# Patient Record
Sex: MALE | Race: WHITE | NOT HISPANIC OR LATINO | Employment: OTHER | URBAN - METROPOLITAN AREA
[De-identification: names, ages, dates, MRNs, and addresses within clinical notes are randomized per-mention and may not be internally consistent; named-entity substitution may affect disease eponyms.]

---

## 2017-04-10 DIAGNOSIS — I63.9 LEFT-SIDED CEREBROVASCULAR ACCIDENT (CVA): Primary | ICD-10-CM

## 2017-04-20 ENCOUNTER — HOSPITAL ENCOUNTER (OUTPATIENT)
Dept: RADIOLOGY | Facility: HOSPITAL | Age: 77
Discharge: HOME | End: 2017-04-20
Attending: ORTHOPAEDIC SURGERY

## 2017-04-20 ENCOUNTER — OFFICE VISIT (OUTPATIENT)
Dept: INTERNAL MEDICINE | Facility: CLINIC | Age: 77
End: 2017-04-20

## 2017-04-20 ENCOUNTER — OFFICE VISIT (OUTPATIENT)
Dept: ORTHOPEDICS | Facility: CLINIC | Age: 77
End: 2017-04-20

## 2017-04-20 VITALS
HEIGHT: 69 IN | DIASTOLIC BLOOD PRESSURE: 82 MMHG | BODY MASS INDEX: 29.03 KG/M2 | HEART RATE: 66 BPM | WEIGHT: 196 LBS | RESPIRATION RATE: 16 BRPM | SYSTOLIC BLOOD PRESSURE: 140 MMHG

## 2017-04-20 DIAGNOSIS — Z00.00 ROUTINE GENERAL MEDICAL EXAMINATION AT A HEALTH CARE FACILITY: Primary | ICD-10-CM

## 2017-04-20 DIAGNOSIS — I10 ESSENTIAL HYPERTENSION: ICD-10-CM

## 2017-04-20 DIAGNOSIS — M25.551 RIGHT HIP PAIN: Primary | ICD-10-CM

## 2017-04-20 DIAGNOSIS — R39.9 LOWER URINARY TRACT SYMPTOMS (LUTS): ICD-10-CM

## 2017-04-20 DIAGNOSIS — R13.13 PHARYNGEAL DYSPHAGIA: ICD-10-CM

## 2017-04-20 DIAGNOSIS — I63.9 CEREBROVASCULAR ACCIDENT (CVA), UNSPECIFIED MECHANISM: ICD-10-CM

## 2017-04-20 DIAGNOSIS — M25.551 RIGHT HIP PAIN: ICD-10-CM

## 2017-04-20 DIAGNOSIS — R13.10 SWALLOWING DYSFUNCTION: ICD-10-CM

## 2017-04-20 DIAGNOSIS — R53.83 FATIGUE, UNSPECIFIED TYPE: ICD-10-CM

## 2017-04-20 DIAGNOSIS — M70.61 GREATER TROCHANTERIC BURSITIS OF RIGHT HIP: ICD-10-CM

## 2017-04-20 PROCEDURE — 99214 OFFICE O/P EST MOD 30 MIN: CPT | Mod: S$PBB,,, | Performed by: INTERNAL MEDICINE

## 2017-04-20 PROCEDURE — 99999 PR PBB SHADOW E&M-EST. PATIENT-LVL IV: CPT | Mod: PBBFAC,,, | Performed by: ORTHOPAEDIC SURGERY

## 2017-04-20 PROCEDURE — 20610 DRAIN/INJ JOINT/BURSA W/O US: CPT | Mod: PBBFAC | Performed by: ORTHOPAEDIC SURGERY

## 2017-04-20 PROCEDURE — 72100 X-RAY EXAM L-S SPINE 2/3 VWS: CPT | Mod: TC

## 2017-04-20 PROCEDURE — 99214 OFFICE O/P EST MOD 30 MIN: CPT | Mod: PBBFAC | Performed by: ORTHOPAEDIC SURGERY

## 2017-04-20 PROCEDURE — 99213 OFFICE O/P EST LOW 20 MIN: CPT | Mod: S$PBB,25,, | Performed by: ORTHOPAEDIC SURGERY

## 2017-04-20 PROCEDURE — 73521 X-RAY EXAM HIPS BI 2 VIEWS: CPT | Mod: TC

## 2017-04-20 PROCEDURE — 99213 OFFICE O/P EST LOW 20 MIN: CPT | Mod: PBBFAC,27 | Performed by: INTERNAL MEDICINE

## 2017-04-20 PROCEDURE — 72100 X-RAY EXAM L-S SPINE 2/3 VWS: CPT | Mod: 26,,, | Performed by: RADIOLOGY

## 2017-04-20 PROCEDURE — 73521 X-RAY EXAM HIPS BI 2 VIEWS: CPT | Mod: 26,,, | Performed by: RADIOLOGY

## 2017-04-20 PROCEDURE — 99999 PR PBB SHADOW E&M-EST. PATIENT-LVL III: CPT | Mod: PBBFAC,,, | Performed by: INTERNAL MEDICINE

## 2017-04-20 RX ORDER — TRIAMCINOLONE ACETONIDE 40 MG/ML
40 INJECTION, SUSPENSION INTRA-ARTICULAR; INTRAMUSCULAR
Status: DISCONTINUED | OUTPATIENT
Start: 2017-04-20 | End: 2017-04-20 | Stop reason: HOSPADM

## 2017-04-20 RX ORDER — IBUPROFEN 800 MG/1
800 TABLET ORAL EVERY 6 HOURS PRN
Qty: 90 TABLET | Refills: 3 | Status: SHIPPED | OUTPATIENT
Start: 2017-04-20

## 2017-04-20 RX ADMIN — TRIAMCINOLONE ACETONIDE 40 MG: 40 INJECTION, SUSPENSION INTRA-ARTICULAR; INTRAMUSCULAR at 10:04

## 2017-04-20 NOTE — MR AVS SNAPSHOT
[facility] Orthoped[facility]  [address] [address]  Phone: [phone]                  Ar Perez   2017 9:30 AM   Office Visit    Description:  Male : 1940   Provider:  Jake Dacosta MD   Department:  [facility]           Reason for Visit     Right Hip - Pain           Diagnoses this Visit        Comments    Right hip pain    -  Primary     Greater trochanteric bursitis of right hip                To Do List           Future Appointments        Provider Department Dept Phone    2017 1:30 PM Clinton Fitzgerald MD [facility]. [phone]    2017 8:15 AM [facility] MRI WIDE BORE [facility] [phone]    2017 10:15 AM Yaritza Rubio MD [facility] - Urology 4th Floor [phone]    2017 1:00 PM Camacho Wallis MD [facility] [phone]      Goals (5 Years of Data)     None      Follow-Up and Disposition     Return in about 3 months (around 2017).    Follow-up and Disposition History       These Medications        Disp Refills Start End    ibuprofen (ADVIL,MOTRIN) 800 MG tablet 90 tablet 3 2017     Take 1 tablet (800 mg total) by mouth every 6 (six) hours as needed for Pain. - Oral    Pharmacy: [facility] - [place] - [address] [place] [address] Ph #: [phone]         [facility]     [facility] -  Assistance  Unless otherwise directed by your provider, please contact [facility], our nurse care line that is available for  assistance.     Registered nurses in the [facility] provide: appointment scheduling, clinical advisement, health education, and other advisory services.  Call: [phone] (toll free)               Medications           Message regarding Medications     Verify the changes and/or additions to your medication regime listed below are the same as discussed with your  "clinician today.  If any of these changes or additions are incorrect, please notify your healthcare provider.        START taking these NEW medications        Refills    ibuprofen (ADVIL,MOTRIN) 800 MG tablet 3    Sig: Take 1 tablet (800 mg total) by mouth every 6 (six) hours as needed for Pain.    Class: Print    Route: Oral      These medications were administered today        Dose Freq    triamcinolone acetonide injection 40 mg 40 mg     Sig: Inject 40 mg into the articular space.    Class: Normal    Route: Intra-articular      STOP taking these medications     GLUCOSAMINE-CHONDROITIN-VIT D3 ORAL Take 1 tablet by mouth once daily. ON HOLD           Verify that the below list of medications is an accurate representation of the medications you are currently taking.  If none reported, the list may be blank. If incorrect, please contact your healthcare provider. Carry this list with you in case of emergency.           Current Medications     aspirin 325 MG tablet Take 1 tablet (325 mg total) by mouth once daily.    atorvastatin (LIPITOR) 40 MG tablet Take 1 tablet (40 mg total) by mouth once daily.    cholecalciferol, vitamin D3, 50,000 unit capsule Take by mouth once a week.    CYANOCOBALAMIN, VITAMIN B-12, (VITAMIN B-12 ORAL) Take 1 tablet by mouth once daily.    docusate sodium (COLACE) 100 MG capsule Take 1 capsule (100 mg total) by mouth 2 (two) times daily.    finasteride (PROSCAR) 5 mg tablet Take 1 tablet (5 mg total) by mouth once daily.    ibuprofen (ADVIL,MOTRIN) 800 MG tablet Take 1 tablet (800 mg total) by mouth every 6 (six) hours as needed for Pain.    losartan (COZAAR) 50 MG tablet Take 1 tablet (50 mg total) by mouth once daily.    multivitamin capsule Take 1 capsule by mouth once daily.    tamsulosin (FLOMAX) 0.4 mg Cp24 Take 1 capsule (0.4 mg total) by mouth once daily.           Clinical Reference Information           Your Vitals Were     BP Pulse Resp Height Weight BMI    140/82 66 16 5' 9" " (1.753 m) 88.9 kg (195 lb 15.8 oz) 28.94 kg/m2      Blood Pressure          Most Recent Value    BP  (!)  140/82      Allergies as of 4/20/2017     Bactrim [Sulfamethoxazole-trimethoprim]    Ambien [Zolpidem]      Immunizations Administered on Date of Encounter - 4/20/2017     None      Orders Placed During Today's Visit      Normal Orders This Visit    Ambulatory Referral to Physical/Occupational Therapy     Large Joint Aspiration/Injection     Future Labs/Procedures Expected by Expires    X-Ray Hips Bilateral 2 View Inc AP Pelvis  4/20/2017 4/20/2018    X-Ray Lumbar Spine Ap And Lateral  4/20/2017 4/20/2018      Administrations This Visit     triamcinolone acetonide injection 40 mg     Admin Date Action Dose Route Administered By             04/20/2017 Given 40 mg Intra-articular Jake Dacosta MD                      Language Assistance Services     ATTENTION: Language assistance services are available, free of charge. Please call [phone].      ATENCIÓN: Si habla español, tiene a sultana disposición servicios gratuitos de asistencia lingüística. Llame al [phone].     CHÚ Ý: N?u b?n nói Ti?ng Vi?t, có các d?ch v? h? tr? ngôn ng? mi?n phí dành cho b?n. G?i s? [phone].         [facility] complies with applicable Federal civil rights laws and does not discriminate on the basis of race, color, national origin, age, disability, or sex.

## 2017-04-20 NOTE — PROGRESS NOTES
CC:   right hip pain  HPI:  77 y.o. yo male who presents with right hip pain.      Is a very pleasant native of [place] who presents with right hip pain.  He states the pain is mostly on the outside of his hip.  His been present for the greater part of the year.  It happened as he began rehabilitation for a stroke.  There was no event it just began.  The pain radiates down the lateral aspect of his hip and sometimes into his buttocks and down his buttocks.  The pain is sharp and stabbing.  It's made worse when he stands for too long.  His had no treatment.  He also has some low back pain.  He has had no treatment for this.    PAST MEDICAL HISTORY:   Past Medical History:   Diagnosis Date    Arthritis     BPH (benign prostatic hypertrophy) with urinary obstruction     Cataract     right eye    Essential hypertension 10/16/2012    Hypertension     PONV (postoperative nausea and vomiting)     Stroke     Urinary tract infection      PAST SURGICAL HISTORY:   Past Surgical History:   Procedure Laterality Date    ADENOIDECTOMY      APPENDECTOMY      CATARACT EXTRACTION  6-    os    EPIDIDYMECTOMY      SHOULDER ARTHROSCOPY      SHOULDER SURGERY      TONSILLECTOMY       FAMILY HISTORY:   Family History   Problem Relation Age of Onset    Cancer Mother     Cataracts Mother     Heart disease Father     Heart attack Father     Cancer Brother     Amblyopia Neg Hx     Blindness Neg Hx     Glaucoma Neg Hx     Macular degeneration Neg Hx     Retinal detachment Neg Hx     Strabismus Neg Hx     Melanoma Neg Hx      SOCIAL HISTORY:   Social History     Social History    Marital status:      Spouse name: N/A    Number of children: N/A    Years of education: N/A     Occupational History    Not on file.     Social History Main Topics    Smoking status: Never Smoker    Smokeless tobacco: Never Used    Alcohol use Yes      Comment: socially    Drug use: No    Sexual activity: Not on file  "    Other Topics Concern    Not on file     Social History Narrative       MEDICATIONS:   Current Outpatient Prescriptions:     aspirin 325 MG tablet, Take 1 tablet (325 mg total) by mouth once daily., Disp: , Rfl: 0    atorvastatin (LIPITOR) 40 MG tablet, Take 1 tablet (40 mg total) by mouth once daily., Disp: 90 tablet, Rfl: 3    cholecalciferol, vitamin D3, 50,000 unit capsule, Take by mouth once a week., Disp: 12 capsule, Rfl: 0    CYANOCOBALAMIN, VITAMIN B-12, (VITAMIN B-12 ORAL), Take 1 tablet by mouth once daily., Disp: , Rfl:     docusate sodium (COLACE) 100 MG capsule, Take 1 capsule (100 mg total) by mouth 2 (two) times daily., Disp: , Rfl: 0    finasteride (PROSCAR) 5 mg tablet, Take 1 tablet (5 mg total) by mouth once daily., Disp: 30 tablet, Rfl: 1    losartan (COZAAR) 50 MG tablet, Take 1 tablet (50 mg total) by mouth once daily., Disp: 15 tablet, Rfl: 0    multivitamin capsule, Take 1 capsule by mouth once daily., Disp: , Rfl:     tamsulosin (FLOMAX) 0.4 mg Cp24, Take 1 capsule (0.4 mg total) by mouth once daily., Disp: 30 capsule, Rfl: 1    ibuprofen (ADVIL,MOTRIN) 800 MG tablet, Take 1 tablet (800 mg total) by mouth every 6 (six) hours as needed for Pain., Disp: 90 tablet, Rfl: 3    Current Facility-Administered Medications:     doxycycline tablet 100 mg, 100 mg, Oral, 1 time in Clinic/HOD, Yaritza Rubio MD  ALLERGIES:   Review of patient's allergies indicates:   Allergen Reactions    Bactrim [sulfamethoxazole-trimethoprim] Rash    Ambien [zolpidem] Rash       VITAL SIGNS: BP (!) 140/82  Pulse 66  Resp 16  Ht 5' 9" (1.753 m)  Wt 88.9 kg (195 lb 15.8 oz)  BMI 28.94 kg/m2     Review of Systems   Constitution: Negative. Negative for chills, fever and night sweats.   HENT: Negative for congestion and headaches.    Eyes: Negative for blurred vision, left vision loss and right vision loss.   Cardiovascular: Negative for chest pain and syncope.   Respiratory: Negative for cough " and shortness of breath.    Endocrine: Negative for polydipsia, polyphagia and polyuria.   Hematologic/Lymphatic: Negative for bleeding problem. Does not bruise/bleed easily.   Skin: Negative for dry skin, itching and rash.   Musculoskeletal: Negative for falls and muscle weakness.  right hip pain  Gastrointestinal: Negative for abdominal pain and bowel incontinence.   Genitourinary: Negative for bladder incontinence and nocturia.   Neurological: Negative for disturbances in coordination, loss of balance and seizures.   Psychiatric/Behavioral: Negative for depression. The patient does not have insomnia.    Allergic/Immunologic: Negative for hives and persistent infections.       Physical Exam   Constitutional: Oriented to person, place, and time. Appears well-developed and well-nourished.   HENT:   Head: Normocephalic and atraumatic.   Nose: Nose normal.   Eyes: No scleral icterus.   Neck: Normal range of motion. Neck supple.   Cardiovascular: Normal rate and regular rhythm.    Pulses:       Radial pulses are 2+ on the right side, and 2+ on the left side.   Pulmonary/Chest: Clear and normal  Abdominal: Soft.   Neurological: Alert and oriented to person, place, and time.   Skin: Skin is warm.   Psychiatric: Normal mood and affect.     Back    ROM shows normal flexion, extension and rotation    Palpation shows no masses    Stability is normal    Strength to flexion and extension well maintained     Core strength is diminished    Skin shows no rashes or cafe au lait spots    Sensation intact to light touch     Left Lower Extremity    Alignment: Neutral    ROM 0-120    Palpation shows no masses;     Tenderness: None    Hip ROM:     Flexion:90     Internal Rotation: 30     External Rotation: 70    Knee stability: Stable to varus and valgus force. acl intact    Skin shows no rashes, lesions or cafe au lait spots    Sensation intact to light touch     Right Lower Extremity     Alignment: Neutral    ROM 0-120    Palpation  "shows no masses    Palpation shows no masses;     Tenderness: He is tenderness palpation at his right greater trochanter.  There is no pain with internal and external rotation of the hip.    Hip ROM:      Flexion:90     Internal Rotation: 30     External Rotation: 60    Knee stability: Stable to varus and valgus force, acl intact    Skin shows no rashes, lesions or cafe au lait spots    Sensation intact to light touch      Xrays:  X-rays of the bilateral hips are ordered and reviewed and my interpretation is as follows: Mild osteoarthritis bilateral hips.  CAM lesions bilateral hips.    Assessment:  Encounter Diagnoses   Name Primary?    Right hip pain Yes    Greater trochanteric bursitis of right hip        Plan:    Orders Placed This Encounter    Large Joint Aspiration/Injection    X-Ray Hips Bilateral 2 View Inc AP Pelvis    X-Ray Lumbar Spine Ap And Lateral    Ambulatory Referral to Physical/Occupational Therapy    ibuprofen (ADVIL,MOTRIN) 800 MG tablet       Return in about 3 months (around 7/20/2017).      He has greater trochanteric bursitis.  This could be from his back as well.  I injected his greater trochanter the clinic and he received instantaneous relief from that pain.  He still has a low back pain.  For this reason I'll send him to see Dr. Camacho Verma.  He'll follow-up with me in 3 months.  I also prescribed and physical therapy which she will take back in [facility].  I also prescribed him ibuprofen 800 mg.  Hemoglobin A1C   Date Value Ref Range Status   06/04/2016 5.6 4.5 - 6.2 % Final   04/20/2015 5.7 4.5 - 6.2 % Final   07/21/2014 5.9 4.5 - 6.2 % Final     Estimated body mass index is 28.94 kg/(m^2) as calculated from the following:    Height as of this encounter: 5' 9" (1.753 m).    Weight as of this encounter: 88.9 kg (195 lb 15.8 oz).            This was dictated using Dragon [id number].   "

## 2017-04-21 ENCOUNTER — LAB VISIT (OUTPATIENT)
Dept: LAB | Facility: HOSPITAL | Age: 77
End: 2017-04-21
Attending: INTERNAL MEDICINE

## 2017-04-21 VITALS
DIASTOLIC BLOOD PRESSURE: 72 MMHG | WEIGHT: 203.94 LBS | HEART RATE: 68 BPM | BODY MASS INDEX: 30.11 KG/M2 | SYSTOLIC BLOOD PRESSURE: 134 MMHG

## 2017-04-21 DIAGNOSIS — R39.9 LOWER URINARY TRACT SYMPTOMS (LUTS): ICD-10-CM

## 2017-04-21 DIAGNOSIS — I63.9 CEREBROVASCULAR ACCIDENT (CVA), UNSPECIFIED MECHANISM: ICD-10-CM

## 2017-04-21 DIAGNOSIS — R53.83 FATIGUE, UNSPECIFIED TYPE: ICD-10-CM

## 2017-04-21 DIAGNOSIS — Z00.00 ROUTINE GENERAL MEDICAL EXAMINATION AT A HEALTH CARE FACILITY: ICD-10-CM

## 2017-04-21 LAB
25(OH)D3+25(OH)D2 SERPL-MCNC: 30 NG/ML
ALBUMIN SERPL BCP-MCNC: 3.8 G/DL
ALP SERPL-CCNC: 124 U/L
ALT SERPL W/O P-5'-P-CCNC: 30 U/L
ANION GAP SERPL CALC-SCNC: 9 MMOL/L
AST SERPL-CCNC: 22 U/L
BASOPHILS # BLD AUTO: 0.01 K/UL
BASOPHILS NFR BLD: 0.1 %
BILIRUB SERPL-MCNC: 0.5 MG/DL
BUN SERPL-MCNC: 18 MG/DL
CALCIUM SERPL-MCNC: 9.4 MG/DL
CHLORIDE SERPL-SCNC: 107 MMOL/L
CHOLEST/HDLC SERPL: 2.4 {RATIO}
CO2 SERPL-SCNC: 26 MMOL/L
COMPLEXED PSA SERPL-MCNC: 0.89 NG/ML
CREAT SERPL-MCNC: 1 MG/DL
DIFFERENTIAL METHOD: ABNORMAL
EOSINOPHIL # BLD AUTO: 0 K/UL
EOSINOPHIL NFR BLD: 0.1 %
ERYTHROCYTE [DISTWIDTH] IN BLOOD BY AUTOMATED COUNT: 13.5 %
EST. GFR  (AFRICAN AMERICAN): >60 ML/MIN/1.73 M^2
EST. GFR  (NON AFRICAN AMERICAN): >60 ML/MIN/1.73 M^2
ESTIMATED AVG GLUCOSE: 128 MG/DL
GLUCOSE SERPL-MCNC: 112 MG/DL
HBA1C MFR BLD HPLC: 6.1 %
HCT VFR BLD AUTO: 40.8 %
HDL/CHOLESTEROL RATIO: 42.3 %
HDLC SERPL-MCNC: 123 MG/DL
HDLC SERPL-MCNC: 52 MG/DL
HGB BLD-MCNC: 14.3 G/DL
IRON SERPL-MCNC: 86 UG/DL
LDLC SERPL CALC-MCNC: 59.8 MG/DL
LYMPHOCYTES # BLD AUTO: 1.6 K/UL
LYMPHOCYTES NFR BLD: 14.6 %
MCH RBC QN AUTO: 30 PG
MCHC RBC AUTO-ENTMCNC: 35 %
MCV RBC AUTO: 86 FL
MONOCYTES # BLD AUTO: 0.6 K/UL
MONOCYTES NFR BLD: 5.4 %
NEUTROPHILS # BLD AUTO: 8.9 K/UL
NEUTROPHILS NFR BLD: 79.5 %
NONHDLC SERPL-MCNC: 71 MG/DL
PLATELET # BLD AUTO: 190 K/UL
PMV BLD AUTO: 10.9 FL
POTASSIUM SERPL-SCNC: 4.5 MMOL/L
PROT SERPL-MCNC: 6.8 G/DL
RBC # BLD AUTO: 4.77 M/UL
SODIUM SERPL-SCNC: 142 MMOL/L
TRIGL SERPL-MCNC: 56 MG/DL
TSH SERPL DL<=0.005 MIU/L-ACNC: 1.5 UIU/ML
WBC # BLD AUTO: 11.24 K/UL

## 2017-04-21 PROCEDURE — 85025 COMPLETE CBC W/AUTO DIFF WBC: CPT

## 2017-04-21 PROCEDURE — 36415 COLL VENOUS BLD VENIPUNCTURE: CPT

## 2017-04-21 PROCEDURE — 83540 ASSAY OF IRON: CPT

## 2017-04-21 PROCEDURE — 80053 COMPREHEN METABOLIC PANEL: CPT

## 2017-04-21 PROCEDURE — 80061 LIPID PANEL: CPT

## 2017-04-21 PROCEDURE — 83036 HEMOGLOBIN GLYCOSYLATED A1C: CPT

## 2017-04-21 PROCEDURE — 84153 ASSAY OF PSA TOTAL: CPT

## 2017-04-21 PROCEDURE — 84443 ASSAY THYROID STIM HORMONE: CPT

## 2017-04-21 PROCEDURE — 82306 VITAMIN D 25 HYDROXY: CPT

## 2017-04-21 NOTE — PROGRESS NOTES
"Subjective:       Patient ID: Ar Perez is a 77 y.o. male.    Chief Complaint: Follow-up    HPIPleasant gentleman from [facility] here for follow up. He is s/p L CVA with residual R weakness. He spent the better poart of 2 months at [facility] last year in rehabilitation. He did well and was dc'd home to contin ue with his PT. Overall doing better, but has noted fatigue, lack of energy. He was seen by his local doctor who rx'd iron supplements which he is still taking. He denies pica, black stools, nausea. I will look into these complaints and treat accordingly. He also reports some dysphagia to liquids noting he coughs when drinking liquids, not so with solids. He had difficulty swaallowing during the initial weeks after his stroke, required PEG placement and later removed as swallowing studies had improved. He reports no additional weakness other that the R side which is still not "100%". I discussed the fact that after a stroke of this kind, it takes a rather long time to achieve improvement and that it is likely he will not regain 100% as he expects. Neverthelss, I encouraged him to continue with his therapy and offered specific recommendations in this regard that will help in the long run. I will obtain a swallow study and treat accordingly.  Regarding LUTS, he has increased urinary frequency, nocturia - bothersome. No significant incontinence, but urgency. I will make appt with Urology fr evaluation.  Regarding his HTN, he was placed on Cozaar 50 mgs daily, but his local MD recently decreased the dose to 25 mgs as he was experiencing hypotensive sx's. He denies any syncope, SOB, CP, KIM.  Review of Systems   Musculoskeletal: Positive for arthralgias. Negative for joint swelling.        R hip pain.   All other systems reviewed and are negative.      Objective:      Physical Exam   Constitutional: He is oriented to person, place, and time. He appears well-developed and well-nourished.   HENT:   Head: " Normocephalic and atraumatic.   Right Ear: External ear normal.   Left Ear: External ear normal.   Mouth/Throat: Oropharynx is clear and moist. No oropharyngeal exudate.   Eyes: Conjunctivae and EOM are normal. Pupils are equal, round, and reactive to light. Right eye exhibits no discharge. Left eye exhibits no discharge. No scleral icterus.   Neck: Normal range of motion. Neck supple. No JVD present. No thyromegaly present.   Cardiovascular: Normal rate, regular rhythm, normal heart sounds and intact distal pulses.    Pulmonary/Chest: Effort normal and breath sounds normal. No respiratory distress. He has no wheezes. He exhibits no tenderness.   Abdominal: Soft. Bowel sounds are normal. He exhibits no distension. There is no tenderness.   Musculoskeletal: He exhibits tenderness. He exhibits no edema.   Tenderness R hip bursa.   Lymphadenopathy:     He has no cervical adenopathy.   Neurological: He is alert and oriented to person, place, and time. Coordination abnormal.   Mild decrease in gag reflex.   Skin: Skin is warm and dry. No rash noted. He is not diaphoretic. No erythema.   Psychiatric: He has a normal mood and affect. His behavior is normal. Judgment and thought content normal.   Nursing note and vitals reviewed.      Assessment:       1. Routine general medical examination at a health care facility    2. Cerebrovascular accident (CVA), unspecified mechanism    3. Lower urinary tract symptoms (LUTS)    4. Swallowing dysfunction    5. Fatigue, unspecified type    6. Essential hypertension    7. Pharyngeal dysphagia     8.     Bursitis R hip - seen by Ortho earlier today. S/P steroid injection.  Plan:    1. Obtain labs.         2. Swallow study.         3. Refer to Urology.         4. RTC for review.

## 2017-04-24 ENCOUNTER — OFFICE VISIT (OUTPATIENT)
Dept: OPTOMETRY | Facility: CLINIC | Age: 77
End: 2017-04-24

## 2017-04-24 DIAGNOSIS — H25.11 NUCLEAR SCLEROSIS, RIGHT: Primary | ICD-10-CM

## 2017-04-24 PROCEDURE — 99999 PR PBB SHADOW E&M-EST. PATIENT-LVL II: CPT | Mod: PBBFAC,,, | Performed by: OPTOMETRIST

## 2017-04-24 PROCEDURE — 92014 COMPRE OPH EXAM EST PT 1/>: CPT | Mod: S$PBB,,, | Performed by: OPTOMETRIST

## 2017-04-24 PROCEDURE — 99212 OFFICE O/P EST SF 10 MIN: CPT | Mod: PBBFAC | Performed by: OPTOMETRIST

## 2017-04-24 NOTE — PROGRESS NOTES
HPI     Patient's last dilated exam was: 7/21/2014 w/ Dr. Jaime  Pt states: blurred vision, right eye, feels as though he is ready for   catarat surgery in his right eye.  Floaters ou. Patient denies   flashes,pain and double vision. Currently living in [facility], will be   traveling to the [facility] this summer and would like have surgery then.   Currently Staying in the [facility]           Last edited by Amanda Reinoso PCT on 4/24/2017  9:16 AM.         Assessment /Plan     For exam results, see Encounter Report.    Nuclear sclerosis, right          1.  Educated on cataracts and affects on vision.  Patient unhappy with vision.  Consult Dr. Hooper for cataract surgery

## 2017-04-25 ENCOUNTER — OFFICE VISIT (OUTPATIENT)
Dept: UROLOGY | Facility: CLINIC | Age: 77
End: 2017-04-25

## 2017-04-25 ENCOUNTER — INITIAL CONSULT (OUTPATIENT)
Dept: ORTHOPEDICS | Facility: CLINIC | Age: 77
End: 2017-04-25

## 2017-04-25 VITALS
HEIGHT: 69 IN | SYSTOLIC BLOOD PRESSURE: 131 MMHG | WEIGHT: 201.06 LBS | HEART RATE: 70 BPM | DIASTOLIC BLOOD PRESSURE: 67 MMHG | BODY MASS INDEX: 29.78 KG/M2

## 2017-04-25 VITALS
SYSTOLIC BLOOD PRESSURE: 130 MMHG | HEIGHT: 69 IN | WEIGHT: 201.19 LBS | BODY MASS INDEX: 29.8 KG/M2 | HEART RATE: 71 BPM | DIASTOLIC BLOOD PRESSURE: 57 MMHG

## 2017-04-25 DIAGNOSIS — M43.10 SPONDYLOLISTHESIS, ACQUIRED: ICD-10-CM

## 2017-04-25 DIAGNOSIS — N40.0 BENIGN NON-NODULAR PROSTATIC HYPERPLASIA WITHOUT LOWER URINARY TRACT SYMPTOMS: ICD-10-CM

## 2017-04-25 DIAGNOSIS — N52.9 ERECTILE DYSFUNCTION, UNSPECIFIED ERECTILE DYSFUNCTION TYPE: ICD-10-CM

## 2017-04-25 DIAGNOSIS — I63.9 LEFT-SIDED CEREBROVASCULAR ACCIDENT (CVA): ICD-10-CM

## 2017-04-25 DIAGNOSIS — M51.36 DDD (DEGENERATIVE DISC DISEASE), LUMBAR: Primary | ICD-10-CM

## 2017-04-25 DIAGNOSIS — R39.15 URINARY URGENCY: Primary | ICD-10-CM

## 2017-04-25 LAB
BILIRUB SERPL-MCNC: NORMAL MG/DL
BLOOD URINE, POC: NORMAL
COLOR, POC UA: NORMAL
GLUCOSE UR QL STRIP: NORMAL
KETONES UR QL STRIP: NORMAL
LEUKOCYTE ESTERASE URINE, POC: NORMAL
NITRITE, POC UA: NORMAL
PH, POC UA: 5
PROTEIN, POC: NORMAL
SPECIFIC GRAVITY, POC UA: 1.01
UROBILINOGEN, POC UA: NORMAL

## 2017-04-25 PROCEDURE — 99214 OFFICE O/P EST MOD 30 MIN: CPT | Mod: S$PBB,,, | Performed by: UROLOGY

## 2017-04-25 PROCEDURE — 99213 OFFICE O/P EST LOW 20 MIN: CPT | Mod: PBBFAC | Performed by: UROLOGY

## 2017-04-25 PROCEDURE — 99213 OFFICE O/P EST LOW 20 MIN: CPT | Mod: PBBFAC,27 | Performed by: ORTHOPAEDIC SURGERY

## 2017-04-25 PROCEDURE — 99999 PR PBB SHADOW E&M-EST. PATIENT-LVL III: CPT | Mod: PBBFAC,,, | Performed by: UROLOGY

## 2017-04-25 PROCEDURE — 81001 URINALYSIS AUTO W/SCOPE: CPT | Mod: PBBFAC | Performed by: UROLOGY

## 2017-04-25 PROCEDURE — 99214 OFFICE O/P EST MOD 30 MIN: CPT | Mod: S$PBB,,, | Performed by: ORTHOPAEDIC SURGERY

## 2017-04-25 PROCEDURE — 99999 PR PBB SHADOW E&M-EST. PATIENT-LVL III: CPT | Mod: PBBFAC,,, | Performed by: ORTHOPAEDIC SURGERY

## 2017-04-25 RX ORDER — OXYBUTYNIN CHLORIDE 5 MG/1
5 TABLET, EXTENDED RELEASE ORAL DAILY
Qty: 30 TABLET | Refills: 11 | Status: ON HOLD | OUTPATIENT
Start: 2017-04-25 | End: 2017-08-14

## 2017-04-25 RX ORDER — OXYBUTYNIN CHLORIDE 5 MG/1
5 TABLET, EXTENDED RELEASE ORAL DAILY
Qty: 30 TABLET | Refills: 11 | Status: SHIPPED | OUTPATIENT
Start: 2017-04-25 | End: 2017-04-25 | Stop reason: SDUPTHER

## 2017-04-25 RX ORDER — TADALAFIL 5 MG/1
5 TABLET ORAL DAILY
Qty: 30 TABLET | Refills: 11 | Status: SHIPPED | OUTPATIENT
Start: 2017-04-25 | End: 2017-08-20

## 2017-04-25 NOTE — MR AVS SNAPSHOT
[facility] - Urology 4th Floor  [address] [facility] [address]  Phone: [phone]                  Ar Perez   2017 10:15 AM   Office Visit    Description:  Male : 1940   Provider:  Yaritza Rubio MD   Department:  [facility] - Urolog 4th Floor           Reason for Visit     Urinary Incontinence     Benign Prostatic Hypertrophy           Diagnoses this Visit        Comments    Urinary urgency    -  Primary     Left-sided cerebrovascular accident (CVA)         Benign non-nodular prostatic hyperplasia without lower urinary tract symptoms         Erectile dysfunction, unspecified erectile dysfunction type                To Do List           Future Appointments        Provider Department Dept Phone    2017 1:00 PM Camacho Wallis MD [facility] [phone]    2017 2:30 PM NOM [id number] [address] LIMIT [facility] [phone]    2017 2:30 PM Malissa Ames [facility] [phone]    2017 10:30 AM Noam Hooper MD [facility] - Ophthalmology [phone]    2017 1:30 PM Clinton Fitzgerald MD [facility] [phone]      Goals (5 Years of Data)     None      Follow-Up and Disposition     Return in about 1 year (around 2018).    Follow-up and Disposition History       These Medications        Disp Refills Start End    oxybutynin (DITROPAN-XL) 5 MG TR24 30 tablet 11 2017    Take 1 tablet (5 mg total) by mouth once daily. - Oral    Pharmacy: [facility] - [place] - [address] Ph #: [phone]         [facility]     [facility] Care [facility] - 24 Assistance  Unless otherwise directed by your provider, please contact [facility], our nurse care line that is available for 24/ assistance.     Registered nurses in the [facility] provide: appointment scheduling, clinical advisement, health  "education, and other advisory services.  Call: [phone] (toll free)               Medications           Message regarding Medications     Verify the changes and/or additions to your medication regime listed below are the same as discussed with your clinician today.  If any of these changes or additions are incorrect, please notify your healthcare provider.        START taking these NEW medications        Refills    oxybutynin (DITROPAN-XL) 5 MG TR24 11    Sig: Take 1 tablet (5 mg total) by mouth once daily.    Class: Print    Route: Oral           Verify that the below list of medications is an accurate representation of the medications you are currently taking.  If none reported, the list may be blank. If incorrect, please contact your healthcare provider. Carry this list with you in case of emergency.           Current Medications     aspirin 325 MG tablet Take 1 tablet (325 mg total) by mouth once daily.    atorvastatin (LIPITOR) 40 MG tablet Take 1 tablet (40 mg total) by mouth once daily.    cholecalciferol, vitamin D3, 50,000 unit capsule Take by mouth once a week.    CYANOCOBALAMIN, VITAMIN B-12, (VITAMIN B-12 ORAL) Take 1 tablet by mouth once daily.    docusate sodium (COLACE) 100 MG capsule Take 1 capsule (100 mg total) by mouth 2 (two) times daily.    finasteride (PROSCAR) 5 mg tablet Take 1 tablet (5 mg total) by mouth once daily.    ibuprofen (ADVIL,MOTRIN) 800 MG tablet Take 1 tablet (800 mg total) by mouth every 6 (six) hours as needed for Pain.    losartan (COZAAR) 50 MG tablet Take 1 tablet (50 mg total) by mouth once daily.    multivitamin capsule Take 1 capsule by mouth once daily.    tamsulosin (FLOMAX) 0.4 mg Cp24 Take 1 capsule (0.4 mg total) by mouth once daily.    oxybutynin (DITROPAN-XL) 5 MG TR24 Take 1 tablet (5 mg total) by mouth once daily.           Clinical Reference Information           Your Vitals Were     BP Pulse Height Weight BMI    131/67 70 5' 9" (1.753 m) 91.2 kg (201 lb " 1 oz) 29.69 kg/m2      Blood Pressure          Most Recent Value    BP  131/67      Allergies as of 4/25/2017     Bactrim [Sulfamethoxazole-trimethoprim]    Ambien [Zolpidem]      Immunizations Administered on Date of Encounter - 4/25/2017     None      Orders Placed During Today's Visit      Normal Orders This Visit    Bladder scan     POCT urinalysis, dipstick or tablet reag          4/25/2017 11:49 AM - Mame Ness LPN      Component Results     Component    Color, UA    Spec Grav UA    1.015    pH, UA    5    WBC, UA    n    Nitrite, UA    n    Protein    n    Glucose, UA    n    Ketones, UA    n    Urobilinogen, UA    n    Bilirubin    n    Blood, UA    n            Language Assistance Services     ATTENTION: Language assistance services are available, free of charge. Please call [phone].      ATENCIÓN: Si habla cj, tiene a sultana disposición servicios gratuitos de asistencia lingüística. Llame al [phone].     CHÚ Ý: N?u b?n nói Ti?ng Vi?t, có các d?ch v? h? tr? ngôn ng? mi?n phí dành cho b?n. G?i s? [phone].         [facility] 4th Floor complies with applicable Federal civil rights laws and does not discriminate on the basis of race, color, national origin, age, disability, or sex.

## 2017-04-25 NOTE — LETTER
April 25, 2017      Jake Dacosta MD  [address] [facility] [address]           [place] [address]  [place] [address]  Phone: [phone]          Patient: Ar Perez   MR Number: [id number]   YOB: 1940   Date of Visit: 4/25/2017       Dear Dr. Jake Dacosta:    Thank you for referring Ar Perez to me for evaluation. Attached you will find relevant portions of my assessment and plan of care.    If you have questions, please do not hesitate to call me. I look forward to following Ar Perez along with you.    Sincerely,    Camacho Wallis MD    Enclosure  CC:  No Recipients    If you would like to receive this communication electronically, please contact [email or web address] or [phone] to request more information on [employer] access.    For providers and/or their staff who would like to refer a patient to [facility], please contact us through our one-stop-shop provider referral line, [facility], at [phone].    If you feel you have received this communication in error or would no longer like to receive these types of communications, please e-mail [email or web address]

## 2017-04-25 NOTE — PROGRESS NOTES
Subjective:       Patient ID: Ar Perez is a 77 y.o. male.    Chief Complaint: Urinary Incontinence and Benign Prostatic Hypertrophy    HPI Comments:  Ar Perez is a 77 y.o. Male with BPH on flomax and finasteride stable for last 7 years.  Patient here for right testicular exam for 24 hours. No fevers.   Recent stroke in June.   H/o left epididymectomy.     BPH symptoms stable. On flomax once daily. Could not tolerate twice daily.   Slightly weaker stream.   Nocturia x3. No extremity edema. He does not limit fluids at night. No caffeine at night.   Good stream.  No straining.   No gross hematuria, recurrent UTI.  No intermittency, hesitancy.  Some urgency. Urinary frequency every 3 hours. Has urinary urgency and incontinence if he can't make it in time.  Had UTI last year. Took antibiotic.   He had a stroke last year, and is on aspirin. His pcp doesn't want him to be under anesthesia.     ED - tried cialis. It worked, but he has used     GFR 60.    Lab Results       Component                Value               Date                       PSA                      0.89                04/21/2017                 PSA                      0.90                04/20/2015                 PSA                      1.2                 07/21/2014                 PSA                      1.41                08/07/2013                 PSA                      1.43                10/16/2012                 PSA                      1.6                 06/13/2011                 PSA                      1.7                 09/07/2010                 PSA                      3.6                 09/04/2009                 PSA                      10 (H)              08/05/2008                 PSA                      3.7                 06/21/2006                  Benign Prostatic Hypertrophy   Irritative symptoms include frequency, nocturia and urgency. Pertinent negatives include no chills or dysuria.       Past  Surgical History:   Procedure Laterality Date    ADENOIDECTOMY      APPENDECTOMY      CATARACT EXTRACTION  6-    os    EPIDIDYMECTOMY      SHOULDER ARTHROSCOPY      SHOULDER SURGERY      TONSILLECTOMY         Past Medical History:   Diagnosis Date    Arthritis     BPH (benign prostatic hypertrophy) with urinary obstruction     Cataract     right eye    Essential hypertension 10/16/2012    Hypertension     PONV (postoperative nausea and vomiting)     Stroke     Urinary tract infection        Social History     Social History    Marital status:      Spouse name: N/A    Number of children: N/A    Years of education: N/A     Occupational History    Not on file.     Social History Main Topics    Smoking status: Never Smoker    Smokeless tobacco: Never Used    Alcohol use Yes      Comment: socially    Drug use: No    Sexual activity: Not on file     Other Topics Concern    Not on file     Social History Narrative       Family History   Problem Relation Age of Onset    Cancer Mother     Cataracts Mother     Heart disease Father     Heart attack Father     Cancer Brother     Amblyopia Neg Hx     Blindness Neg Hx     Glaucoma Neg Hx     Macular degeneration Neg Hx     Retinal detachment Neg Hx     Strabismus Neg Hx     Melanoma Neg Hx        Current Outpatient Prescriptions   Medication Sig Dispense Refill    aspirin 325 MG tablet Take 1 tablet (325 mg total) by mouth once daily.  0    atorvastatin (LIPITOR) 40 MG tablet Take 1 tablet (40 mg total) by mouth once daily. 90 tablet 3    cholecalciferol, vitamin D3, 50,000 unit capsule Take by mouth once a week. 12 capsule 0    CYANOCOBALAMIN, VITAMIN B-12, (VITAMIN B-12 ORAL) Take 1 tablet by mouth once daily.      docusate sodium (COLACE) 100 MG capsule Take 1 capsule (100 mg total) by mouth 2 (two) times daily.  0    finasteride (PROSCAR) 5 mg tablet Take 1 tablet (5 mg total) by mouth once daily. 30 tablet 1     ibuprofen (ADVIL,MOTRIN) 800 MG tablet Take 1 tablet (800 mg total) by mouth every 6 (six) hours as needed for Pain. 90 tablet 3    losartan (COZAAR) 50 MG tablet Take 1 tablet (50 mg total) by mouth once daily. 15 tablet 0    multivitamin capsule Take 1 capsule by mouth once daily.      tamsulosin (FLOMAX) 0.4 mg Cp24 Take 1 capsule (0.4 mg total) by mouth once daily. 30 capsule 1     Current Facility-Administered Medications   Medication Dose Route Frequency Provider Last Rate Last Dose    doxycycline tablet 100 mg  100 mg Oral 1 time in Clinic/HOD Yaritza Rubio MD           Review of patient's allergies indicates:   Allergen Reactions    Bactrim [sulfamethoxazole-trimethoprim] Rash    Ambien [zolpidem] Rash       Review of Systems   Constitutional: Negative for chills, fever and unexpected weight change.   HENT: Negative for hearing loss and nosebleeds.    Eyes: Negative for visual disturbance.   Respiratory: Negative for chest tightness.    Cardiovascular: Negative for chest pain.   Gastrointestinal: Negative for constipation and diarrhea.   Genitourinary: Positive for frequency, nocturia and urgency. Negative for dysuria.   Musculoskeletal: Positive for arthralgias. Negative for joint swelling.   Skin: Negative for rash.   Neurological: Negative for seizures.   Hematological: Does not bruise/bleed easily.   Psychiatric/Behavioral: Negative for behavioral problems.       Objective:      Physical Exam   Constitutional: He is oriented to person, place, and time. He appears well-developed and well-nourished.   HENT:   Head: Normocephalic and atraumatic.   Eyes: No scleral icterus.   Neck: Neck supple.   Cardiovascular: Normal rate and regular rhythm.    Pulmonary/Chest: Effort normal. No respiratory distress.   Abdominal: He exhibits no mass. Hernia confirmed negative in the right inguinal area and confirmed negative in the left inguinal area.   Genitourinary: Testes normal and penis normal.  Circumcised.   Genitourinary Comments: Prostate was smooth without nodularity. No rectal masses.  45 grams.  No external hemorrhoids.   Normal perineum.      Musculoskeletal: He exhibits no tenderness.   Lymphadenopathy:     He has no cervical adenopathy. No inguinal adenopathy noted on the right or left side.   Neurological: He is alert and oriented to person, place, and time.   Skin: Skin is warm. No rash noted.     Psychiatric: He has a normal mood and affect.       Assessment:       1. Urinary urgency    2. Left-sided cerebrovascular accident (CVA)    3. Benign non-nodular prostatic hyperplasia without lower urinary tract symptoms    4. Erectile dysfunction, unspecified erectile dysfunction type        Plan:     continue flomax and finasteride.  Trial of oxybutynin XR 5mg.  F/u 1 year.

## 2017-04-25 NOTE — PROGRESS NOTES
DATE: 4/25/2017  PATIENT: Ar Perez    Attending Physician: Camacho Wallis M.D.    CHIEF COMPLAINT: LBP    HISTORY:  Ar Perez is a 77 y.o. male  here for initial evaluation of low back and right leg pain (Back - 5, Leg - 5). RLE pain is in right buttock.  The pain has been present for 10 months, since he had a stroke in June of last year. The patient describes the pain as dull.  The pain is worse with activity and bending and improved by rest. There is no associated numbness and tingling. There is subjective weakness, 2/2 recovering from stroke. Prior treatments have included ibuprofen as well as PT which is more directed to his stroke symptoms.    The Patient denies myelopathic symptoms such as handwriting changes or difficulty with buttons/coins/keys. Denies perineal paresthesias, bowel/bladder dysfunction.    PAST MEDICAL/SURGICAL HISTORY:  Past Medical History:   Diagnosis Date    Arthritis     BPH (benign prostatic hypertrophy) with urinary obstruction     Cataract     right eye    Essential hypertension 10/16/2012    Hypertension     PONV (postoperative nausea and vomiting)     Stroke     Urinary tract infection      Past Surgical History:   Procedure Laterality Date    ADENOIDECTOMY      APPENDECTOMY      CATARACT EXTRACTION  6-    os    EPIDIDYMECTOMY      SHOULDER ARTHROSCOPY      SHOULDER SURGERY      TONSILLECTOMY         Current Medications:   Current Outpatient Prescriptions:     aspirin 325 MG tablet, Take 1 tablet (325 mg total) by mouth once daily., Disp: , Rfl: 0    atorvastatin (LIPITOR) 40 MG tablet, Take 1 tablet (40 mg total) by mouth once daily., Disp: 90 tablet, Rfl: 3    cholecalciferol, vitamin D3, 50,000 unit capsule, Take by mouth once a week., Disp: 12 capsule, Rfl: 0    CYANOCOBALAMIN, VITAMIN B-12, (VITAMIN B-12 ORAL), Take 1 tablet by mouth once daily., Disp: , Rfl:     docusate sodium (COLACE) 100 MG capsule, Take 1 capsule  (100 mg total) by mouth 2 (two) times daily., Disp: , Rfl: 0    finasteride (PROSCAR) 5 mg tablet, Take 1 tablet (5 mg total) by mouth once daily., Disp: 30 tablet, Rfl: 1    ibuprofen (ADVIL,MOTRIN) 800 MG tablet, Take 1 tablet (800 mg total) by mouth every 6 (six) hours as needed for Pain., Disp: 90 tablet, Rfl: 3    losartan (COZAAR) 50 MG tablet, Take 1 tablet (50 mg total) by mouth once daily., Disp: 15 tablet, Rfl: 0    multivitamin capsule, Take 1 capsule by mouth once daily., Disp: , Rfl:     oxybutynin (DITROPAN-XL) 5 MG TR24, Take 1 tablet (5 mg total) by mouth once daily., Disp: 30 tablet, Rfl: 11    tadalafil (CIALIS) 5 MG tablet, Take 1 tablet (5 mg total) by mouth Daily. Take half pill daily., Disp: 30 tablet, Rfl: 11    tamsulosin (FLOMAX) 0.4 mg Cp24, Take 1 capsule (0.4 mg total) by mouth once daily., Disp: 30 capsule, Rfl: 1    Current Facility-Administered Medications:     doxycycline tablet 100 mg, 100 mg, Oral, 1 time in Clinic/HOD, Yaritza Rubio MD    Social History:   Social History     Social History    Marital status:      Spouse name: N/A    Number of children: N/A    Years of education: N/A     Occupational History    Not on file.     Social History Main Topics    Smoking status: Never Smoker    Smokeless tobacco: Never Used    Alcohol use Yes      Comment: socially    Drug use: No    Sexual activity: Not on file     Other Topics Concern    Not on file     Social History Narrative       REVIEW OF SYSTEMS:  Constitution: Negative. Negative for chills, fever and night sweats.   Cardiovascular: Negative for chest pain and syncope.   Respiratory: Negative for cough and shortness of breath.   Gastrointestinal: See HPI. Negative for nausea/vomiting. Negative for abdominal pain.  Genitourinary: See HPI. Negative for discoloration or dysuria.  Hematologic/Lymphatic: neg for bleeding/clotting disorders.   Musculoskeletal: Negative for falls and muscle weakness.  "  Neurological: See HPI. no history of seizures. no history of cranial surgery or shunts.  Neurological: See HPI. No seizures.   Endocrine: Negative for polydipsia, polyphagia and polyuria.   Allergic/Immunologic: Negative for hives and persistent infections.     EXAM:  BP (!) 130/57  Pulse 71  Ht 5' 9" (1.753 m)  Wt 91.3 kg (201 lb 2.7 oz)  BMI 29.71 kg/m2    PHYSICAL EXAMINATION:    General: The patient is a  77 y.o. male in no apparent distress, the patient is orientatied to person, place and time.  Psych: Normal mood and affect  HEENT: Vision grossly intact, hearing intact to the spoken word.  Lungs: Respirations unlabored.  Gait: Normal station, gait imparied 2/2 prior stroke  Skin: Dorsal lumbar skin negative for rashes, lesions, hairy patches and surgical scars. There is mild lumbar tenderness to palpation.  Range of motion: Lumbar range of motion is acceptable.  Spinal Balance: Global saggital and coronal spinal balance acceptable, no significant for scoliosis and kyphosis.  Musculoskeletal: No pain with the range of motion of the bilateral hips. No trochanteric tenderness to palpation.  Vascular: Bilateral lower extremities warm and well perfused, Dorsalis pedis pulses 2+ bilaterally.  Neurological: Normal strength and tone in all major motor groups in the bilateral lower extremities. Normal sensation to light touch in the L2-S1 dermatomes bilaterally.   Deep tendon reflexes symmetric 2+ in the bilateral lower extremities.  Negative Babinski bilaterally.     IMAGING:      Today I personally reviewed AP, Lat and Flex/Ex  upright L-spine that demonstrate mild spondylosis with grade 1 spondylolisthesis of L4 on L5.       ASSESSMENT/PLAN:    Diagnoses and all orders for this visit:    DDD (degenerative disc disease), lumbar    Spondylolisthesis, acquired    Plan for home exercise program and NSAIDs as needed. Will order MRI L spine if not improved.    "

## 2017-04-27 ENCOUNTER — HOSPITAL ENCOUNTER (OUTPATIENT)
Dept: RADIOLOGY | Facility: HOSPITAL | Age: 77
Discharge: HOME | End: 2017-04-27
Attending: INTERNAL MEDICINE

## 2017-04-27 ENCOUNTER — CLINICAL SUPPORT (OUTPATIENT)
Dept: SPEECH THERAPY | Facility: HOSPITAL | Age: 77
End: 2017-04-27
Attending: INTERNAL MEDICINE

## 2017-04-27 DIAGNOSIS — R13.13 PHARYNGEAL DYSPHAGIA: ICD-10-CM

## 2017-04-27 DIAGNOSIS — Z00.00 ROUTINE GENERAL MEDICAL EXAMINATION AT A HEALTH CARE FACILITY: ICD-10-CM

## 2017-04-27 DIAGNOSIS — R13.10 SWALLOWING DYSFUNCTION: ICD-10-CM

## 2017-04-27 PROCEDURE — G8998 SWALLOW D/C STATUS: HCPCS | Mod: GN,CI

## 2017-04-27 PROCEDURE — 74230 X-RAY XM SWLNG FUNCJ C+: CPT | Mod: TC

## 2017-04-27 PROCEDURE — 74230 X-RAY XM SWLNG FUNCJ C+: CPT | Mod: 26,,, | Performed by: RADIOLOGY

## 2017-04-27 PROCEDURE — G8996 SWALLOW CURRENT STATUS: HCPCS | Mod: GN,CI

## 2017-04-27 PROCEDURE — 92611 MOTION FLUOROSCOPY/SWALLOW: CPT | Mod: GN

## 2017-04-27 PROCEDURE — G8997 SWALLOW GOAL STATUS: HCPCS | Mod: GN,CH

## 2017-04-27 NOTE — PROGRESS NOTES
MODIFIED BARIUM SWALLOW STUDY SPEECH PATHOLOGY REPORT    REASON FOR REFERRAL:  Ar Perez, age 77 y.o., was referred by Clinton Davis MD  for a Modified Barium Swallow Study to rule out aspiration, assess his overall swallowing function, and determine safest consistencies for oral intake.      SWALLOWING HISTORY  Patient states that he has been having difficulty swallowing liquids since he began oral intake post CVA in 2016.  He stated that he only has difficulty on liquids and that they make him cough. Other consistencies are not problematic.  Diet consistency at present is regular diet with thin liquids.    Medications are taken by mouth with water.  Patient has not been receiving dysphagia therapy through speech pathology services.        MEDICAL HISTORY:    Significant for CVA in June 2016. He was NPO for one week due to silent aspiration and deep penetration of all liquid consistencies with an ineffective cough for clearing aspirated materials. After one week he was returned to oral intake on a regular diet with thin liquids.  He states that from the time he began oral intake again, he has had difficulty with liquids making him cough. He is currently in the [place] for a comprehensive medical follow up visit and will be returning to his country[place] in on May 10, [place]. Significant negatives, negative for history of pneumonia, negative for weight loss.    Other medical history includes:  Past Medical History:   Diagnosis Date    Arthritis     BPH (benign prostatic hypertrophy) with urinary obstruction     Cataract     right eye    Essential hypertension 10/16/2012    Hypertension     PONV (postoperative nausea and vomiting)     Stroke     Urinary tract infection         SURGICAL HISTORY:  Past Surgical History:   Procedure Laterality Date    ADENOIDECTOMY      APPENDECTOMY      CATARACT EXTRACTION  6-    os    EPIDIDYMECTOMY      SHOULDER ARTHROSCOPY      SHOULDER  SURGERY      TONSILLECTOMY           FAMILY HISTORY:  Family History   Problem Relation Age of Onset    Cancer Mother     Cataracts Mother     Heart disease Father     Heart attack Father     Cancer Brother     Amblyopia Neg Hx     Blindness Neg Hx     Glaucoma Neg Hx     Macular degeneration Neg Hx     Retinal detachment Neg Hx     Strabismus Neg Hx     Melanoma Neg Hx         SOCIAL HISTORY:  Mr. Perez lives with his wife in [facility].    BEHAVIOR:  Ar Perez was a very pleasant gentleman who had normal affect and social interaction.  He was able to fully cooperate during the study.  Results of today's assessment were considered indicative of his current levels of swallowing functioning.      HEARING:  Subjectively, within normal limits.     ORAL PERIPHERAL:   Informal examination of the oral mechanism revealed structures and functioning within normal limits for swallowing and speech purposes.    Voice quality was good. No wet or gurgled quality was appreciated before, during or after the study.    TEST FINDINGS:   Patient was seen in Radiology with the Radiologist for a Modified Barium Swallow Study and was seated on a chair for a left lateral videofluoroscopic view      Consistencies assessed using radiopaque barium contrast:  thin (3 ml and 5 ml boluses via spoon and single and continuous swallows via open cup)  thin puree (5 ml) via spoon,   thick puree (3 ml and 5 ml) via spoon  solid (3 ml and 5 nl cracker boluses) by patient's hand  barium tablet with water.        Phases:  Oral:  Patient was able to obtain liquid and strip utensils adequately with no anterior loss of material from the oral cavity.  He moved boluses through the oral cavity with appropriate transit time.  There was no pooling of liquids in the mouth.  Swallow reflex was triggered within normal limits for most consistencies, however on large sips of thin liquids, aspiration occurred before the swallow was  triggered.    Pharyngeal:  Boluses moved through the pharyngeal phase with darion laryngeal penetration and aspiration on large and consecutive swallows of thin liquids. Intentional throat clear and cough were minimally effective in expelling the aspirated material from the airway. Patient had independent awareness of aspiration and independent initiation of cough and throat clear.There was no aspiration or penetration on other consistencies. and no nasal regurgitation. Within normal limits with prompt initiation on all consistencies except thin liquids, appropriate soft palate elevation, adequate laryngeal elevation, adequate anterior hyoid excursion, adequate epiglottic movement, reduced laryngeal vestibular closure, reduced pharyngeal stripping wave, adequate PE segment opening, decreased tongue base retraction, and pharyngeal residue which increased with the consistency of the bolus.  On thicker consistencies, second swallows cleared most of the residue, but not consistently. Patient had no difficulty swallowing a barium tablet with a sip of water.    Cervical Esophageal:  Boluses entered the upper esophagus within normal limits.  No obstruction was noted.    Therapeutic interventions to reduce risk for pen/asp/residue: n/a.   1.On thin liquids, a chin tuck and taking small sips from the cup were effective in eliminating aspiration.      Rosenbeck 8-point Penetration-Aspiration Scale:    7 - Material enters the airway, passes below the vocal folds, and is not ejected from the trachea despite effort on thin liquids only.     1 - Material does not enter airway on all other consistencies.    IMPRESSIONS:   This 77 y.o. gentleman appears to present with    1. Pharyngeal dysphagia characterized by penetration and aspiration of thin liquids when taken in large sips from a cup.  2. Demonstrated ability to eliminate aspiration with compensatory strategy  3. No cervical esophageal swallowing abnormalities were noted.  4.  "Status post CVA  5. As required for Medicare G-codes/severity indicators, the patient's swallowing was rated as follows:         Swallowing  Current status:  FCM:  LEVEL 6: Swallowing is safe, and the individual eats and drinks independently and may rarely require minimal cueing. The individual usually self-cues when difficulty occurs. May need to avoid specific food items (e.g., popcorn and nuts), or require additional time (due to dysphagia).   -   Projected status:  FCM:   LEVEL 7: The individual's ability to eat independently is not limited by swallow function.  Swallowing would be safe and efficient for all consistencies. Compensatory strategies are effectively used when needed.   -   Discharge status:  FCM:   LEVEL 6: Swallowing is safe, and the individual eats and drinks independently and may rarely require minimal cueing. The individual usually self-cues when difficulty occurs. May need to avoid specific food items (e.g., popcorn and nuts), or require additional time (due to dysphagia).   -  CI         RECOMMENDATIONS/PLAN OF CARE:   It is felt that he would benefit from    1. Regular diet consistency with thin liquids and medications taken orally only if he is consistent with the followin.  Upright posture for all oral intake and remain fully upright for at least 30 minutes to 1 hour after oral intake unless otherwise indicated by the physician or other healthcare provider. No head tilting upward or backward to eat/drink. Take small sips/bites (1/2 to 1 teaspoon in size or as otherwise tolerated or preferred by the patient). Do not inhale, talk, laugh, or make any sounds with the voice if swallowing something in the back of the mouth/throat. Swallow each single sip/bite fully all the way through the throat/neck before another is put in the mouth. Take additional effortful "dry"/saliva swallows to help clear pooling in the throat.  Alternate food bites with small sips of liquid to " clear food from the throat.    3. Take medications orally with small sips of water    4. Initiate dysphagia treatment for a brief period of one session to educate patient in exercises to increase strength of swallow and protection of airway.    5. Information will be mailed to the patient re: the above findings and recommendations.    6. Review of the swallow study results by the referring physician for further management of the patients concerns.    7. Contact [facility] at [phone] with any further questions or concerns.        Long-term goals:  Patient will be able to consume a regular diet with thin liquids with no signs/symptoms of aspiration.      Short-term objectives:  Patient will  1.  Present for one session of education regarding improving and strengthening swallow function and airway protection.

## 2017-04-27 NOTE — MR AVS SNAPSHOT
[facility]  [address]  [place] [address]  Phone: [phone]                  Ar Perez   2017 2:30 PM   Clinical Support    Description:  Male : 1940   Provider:  Malissa Ames CCC-SLP   Department:  [facility]           Reason for Visit     SLP Initial Evaluation           Diagnoses this Visit        Comments    Swallowing dysfunction         Routine general medical examination at a health care facility         Pharyngeal dysphagia                To Do List           Future Appointments        Provider Department Dept Phone    2017 10:30 AM Noam Hopoer MD [facility] - Ophthalmology [phone]    2017 1:30 PM Clinton Fitzgerald MD [facility] [phone]      Goals (5 Years of Data)     None      [facility]     [facility] Care [facility] - 24/7 Assistance  Unless otherwise directed by your provider, please contact [facility], our nurse care line that is available for /7 assistance.     Registered nurses in the [facility] provide: appointment scheduling, clinical advisement, health education, and other advisory services.  Call: [phone] (toll free)               Medications           Message regarding Medications     Verify the changes and/or additions to your medication regime listed below are the same as discussed with your clinician today.  If any of these changes or additions are incorrect, please notify your healthcare provider.             Verify that the below list of medications is an accurate representation of the medications you are currently taking.  If none reported, the list may be blank. If incorrect, please contact your healthcare provider. Carry this list with you in case of emergency.           Current Medications     aspirin 325 MG tablet Take 1 tablet (325 mg total) by mouth once daily.    atorvastatin (LIPITOR) 40 MG tablet Take 1 tablet  (40 mg total) by mouth once daily.    cholecalciferol, vitamin D3, 50,000 unit capsule Take by mouth once a week.    CYANOCOBALAMIN, VITAMIN B-12, (VITAMIN B-12 ORAL) Take 1 tablet by mouth once daily.    docusate sodium (COLACE) 100 MG capsule Take 1 capsule (100 mg total) by mouth 2 (two) times daily.    finasteride (PROSCAR) 5 mg tablet Take 1 tablet (5 mg total) by mouth once daily.    ibuprofen (ADVIL,MOTRIN) 800 MG tablet Take 1 tablet (800 mg total) by mouth every 6 (six) hours as needed for Pain.    losartan (COZAAR) 50 MG tablet Take 1 tablet (50 mg total) by mouth once daily.    multivitamin capsule Take 1 capsule by mouth once daily.    oxybutynin (DITROPAN-XL) 5 MG TR24 Take 1 tablet (5 mg total) by mouth once daily.    tadalafil (CIALIS) 5 MG tablet Take 1 tablet (5 mg total) by mouth Daily. Take half pill daily.    tamsulosin (FLOMAX) 0.4 mg Cp24 Take 1 capsule (0.4 mg total) by mouth once daily.           Clinical Reference Information           Allergies as of 4/27/2017     Bactrim [Sulfamethoxazole-trimethoprim]    Ambien [Zolpidem]      Immunizations Administered on Date of Encounter - 4/27/2017     None      Orders Placed During Today's Visit      Normal Orders This Visit    SLP video swallow       Language Assistance Services     ATTENTION: Language assistance services are available, free of charge. Please call [phone].      ATENCIÓN: Si habla español, tiene a sultana disposición servicios gratuitos de asistencia lingüística. Llame al [phone].     [facility] Ý: N?u b?n nói Ti?ng Vi?t, có các d?ch v? h? tr? ngôn ng? mi?n phí dành cho b?n. G?i s? [phone].         [facility] complies with applicable Federal civil rights laws and does not discriminate on the basis of race, color, national origin, age, disability, or sex.

## 2017-04-27 NOTE — Clinical Note
Dr. Fitzgerald,  Mr. Perez's MBSS results indicated the followin. Pharyngeal dysphagia characterized by penetration and aspiration of thin liquids when taken in large sips from a cup. 2. Demonstrated ability to eliminate aspiration with compensatory strategy 3. No cervical esophageal swallowing abnormalities were noted.  Recommend: 1. Regular diet consistency with thin liquids and medications taken orally only if he is consistent with swallowing precautions in full report.   I would also like to see him for one visit of dysphagia therapy before he returns to [facility] to review precautions and educate him on exercises which can facilitate strengthening of his swallow and improving airway protection.   If you agree with the therapy session, please enter order for  SLP Treatment for swallowing diagnosis code of I63.9 (ICD-10-CM) - Stroke Pharyngeal dysphagia R13.13 (ICD-10-CM) Thank you, Malissa Ames  Speech Pathologist

## 2017-04-28 ENCOUNTER — OFFICE VISIT (OUTPATIENT)
Dept: INTERNAL MEDICINE | Facility: CLINIC | Age: 77
End: 2017-04-28

## 2017-04-28 ENCOUNTER — OFFICE VISIT (OUTPATIENT)
Dept: OPHTHALMOLOGY | Facility: CLINIC | Age: 77
End: 2017-04-28

## 2017-04-28 DIAGNOSIS — I63.9 CEREBROVASCULAR ACCIDENT (CVA), UNSPECIFIED MECHANISM: Primary | ICD-10-CM

## 2017-04-28 DIAGNOSIS — Z71.89 ENCOUNTER FOR MEDICATION REVIEW AND COUNSELING: Primary | ICD-10-CM

## 2017-04-28 DIAGNOSIS — R13.13 PHARYNGEAL DYSPHAGIA: ICD-10-CM

## 2017-04-28 DIAGNOSIS — H25.11 NUCLEAR SCLEROSIS, RIGHT: Primary | ICD-10-CM

## 2017-04-28 PROCEDURE — 99211 OFF/OP EST MAY X REQ PHY/QHP: CPT | Mod: PBBFAC | Performed by: OPHTHALMOLOGY

## 2017-04-28 PROCEDURE — 99213 OFFICE O/P EST LOW 20 MIN: CPT | Mod: S$PBB,,, | Performed by: INTERNAL MEDICINE

## 2017-04-28 PROCEDURE — 99211 OFF/OP EST MAY X REQ PHY/QHP: CPT | Mod: PBBFAC,27 | Performed by: INTERNAL MEDICINE

## 2017-04-28 PROCEDURE — 92012 INTRM OPH EXAM EST PATIENT: CPT | Mod: S$PBB,,, | Performed by: OPHTHALMOLOGY

## 2017-04-28 PROCEDURE — 99999 PR PBB SHADOW E&M-EST. PATIENT-LVL I: CPT | Mod: PBBFAC,,, | Performed by: INTERNAL MEDICINE

## 2017-04-28 PROCEDURE — 99999 PR PBB SHADOW E&M-EST. PATIENT-LVL I: CPT | Mod: PBBFAC,,, | Performed by: OPHTHALMOLOGY

## 2017-04-28 NOTE — MR AVS SNAPSHOT
[facility] - Ophthal[facility]  [address]  [facility] [address]  Phone: [phone]  Fax: [phone]                  Ar Perez   2017 10:30 AM   Office Visit    Description:  Male : 1940   Provider:  Noam Hooper MD   Department:  [facility] Op[facility]           Diagnoses this Visit        Comments    Nuclear sclerosis, right    -  Primary            To Do List           Future Appointments        Provider Department Dept Phone    2017 1:30 PM Clinton Fitzgerald MD [facility] [phone]      Goals (5 Years of Data)     None      [facility]     [facility] On Call Nurse Care Line -  Assistance  Unless otherwise directed by your provider, please contact [facility], our nurse care line that is available for  assistance.     Registered nurses in the [facility] provide: appointment scheduling, clinical advisement, health education, and other advisory services.  Call: [phone] (toll free)               Medications           Message regarding Medications     Verify the changes and/or additions to your medication regime listed below are the same as discussed with your clinician today.  If any of these changes or additions are incorrect, please notify your healthcare provider.             Verify that the below list of medications is an accurate representation of the medications you are currently taking.  If none reported, the list may be blank. If incorrect, please contact your healthcare provider. Carry this list with you in case of emergency.           Current Medications     aspirin 325 MG tablet Take 1 tablet (325 mg total) by mouth once daily.    atorvastatin (LIPITOR) 40 MG tablet Take 1 tablet (40 mg total) by mouth once daily.    cholecalciferol, vitamin D3, 50,000 unit capsule Take by mouth once a week.    CYANOCOBALAMIN, VITAMIN B-12, (VITAMIN B-12 ORAL) Take 1 tablet by mouth once daily.    docusate sodium  (COLACE) 100 MG capsule Take 1 capsule (100 mg total) by mouth 2 (two) times daily.    finasteride (PROSCAR) 5 mg tablet Take 1 tablet (5 mg total) by mouth once daily.    ibuprofen (ADVIL,MOTRIN) 800 MG tablet Take 1 tablet (800 mg total) by mouth every 6 (six) hours as needed for Pain.    losartan (COZAAR) 50 MG tablet Take 1 tablet (50 mg total) by mouth once daily.    multivitamin capsule Take 1 capsule by mouth once daily.    oxybutynin (DITROPAN-XL) 5 MG TR24 Take 1 tablet (5 mg total) by mouth once daily.    tadalafil (CIALIS) 5 MG tablet Take 1 tablet (5 mg total) by mouth Daily. Take half pill daily.    tamsulosin (FLOMAX) 0.4 mg Cp24 Take 1 capsule (0.4 mg total) by mouth once daily.           Clinical Reference Information           Allergies as of 4/28/2017     Bactrim [Sulfamethoxazole-trimethoprim]    Ambien [Zolpidem]      Immunizations Administered on Date of Encounter - 4/28/2017     None      Language Assistance Services     ATTENTION: Language assistance services are available, free of charge. Please call [phone].      ATENCIÓN: Si habla lindaañol, tiene a sultana disposición servicios gratuitos de asistencia lingüística. Llame al [phone].     KI[facility] Ý: N?u b?n nói Ti?ng Vi?t, có các d?ch v? h? tr? ngôn ng? mi?n phí dành cho b?n. G?i s? [phone].         [facility] complies with applicable Federal civil rights laws and does not discriminate on the basis of race, color, national origin, age, disability, or sex.

## 2017-04-28 NOTE — PROGRESS NOTES
Assessment /Plan     For exam results, see Encounter Report.    Nuclear sclerosis, right      Visually Significant Cataract: Patient reports decreased vision consistent with the clinical amount of lenticular opacity, which reaches the level of visual significance and affects activities of daily living. Risks, benefits, and alternatives to cataract surgery were discussed and the consent reviewed. IOL options were discussed, including ATIOLs and the associated side effects and additional patient cost associated with them.   IOL Selections:   Right eye  IOL: recheck calcs pcboo 22.5/23.0    Pt wishes to have right eye done first.    Visually significant posterior capsular opacity present.  Discussed risks, benefits, and alternatives to laser surgery.  Schedule YAG OS at same visit as pre op cataract.

## 2017-04-28 NOTE — PROGRESS NOTES
Mr Perez here today for his review. I gave him copies of his studies and discussed them in detail including blood work that showed mild increase in FBS at 112 -new finding - with A1-C at 6.1. This is higher than last visit and he admits to indiscretions in his diet, has gained some weight and, due to his stroke, has not been as active physically as before. I encouraged him to be more active and begin gradual exercise program at home besides the physical therapy he has been doing. All other lab parameters were within normal limits. Additional studies included L spine/hip XR that showed mild degenerative changes. I discussed core strengthening exercises to improve back pain. Pain not radicular in nature, likely muscular and should benefit from these exercises as discussed. I also provided the reports generated by the barium swallow and Speech Therapy which detail, evidence of some pharyngeal aspiration to thin liquids that improved with certain head maneuvers that were demonstrated to him. I provided a list of recommendations offered by the therapist that should definitely help. He will continue with his current medications and discussed the additional meds that KRYSTEN Rubio of Urology has recommended for his LUTS. He will follow up with Urology next visit. He will be returning in August for cataract surgery. I will follow up with him at that time.

## 2017-05-03 ENCOUNTER — CLINICAL SUPPORT (OUTPATIENT)
Dept: SPEECH THERAPY | Facility: HOSPITAL | Age: 77
End: 2017-05-03
Attending: INTERNAL MEDICINE

## 2017-05-03 DIAGNOSIS — I63.9 CEREBROVASCULAR ACCIDENT (CVA), UNSPECIFIED MECHANISM: ICD-10-CM

## 2017-05-03 DIAGNOSIS — R13.13 PHARYNGEAL DYSPHAGIA: ICD-10-CM

## 2017-05-03 DIAGNOSIS — R13.14 PHARYNGOESOPHAGEAL DYSPHAGIA: ICD-10-CM

## 2017-05-03 PROCEDURE — G8996 SWALLOW CURRENT STATUS: HCPCS | Mod: GN,CI

## 2017-05-03 PROCEDURE — G8998 SWALLOW D/C STATUS: HCPCS | Mod: GN,CI

## 2017-05-03 PROCEDURE — 92526 ORAL FUNCTION THERAPY: CPT | Mod: GN

## 2017-05-03 PROCEDURE — G8997 SWALLOW GOAL STATUS: HCPCS | Mod: GN,CH

## 2017-05-03 NOTE — PROGRESS NOTES
Treatment time:45 min for treatment of   1. Cerebrovascular accident (CVA), unspecified mechanism    2. Pharyngeal dysphagia         REASON FOR VISIT:  Review of swallowing functioning and execution of swallowing exercises.   Results of MBSS on 4/27/17 indicated:  1. Pharyngeal dysphagia characterized by BOT and pharyngeal weakness resulting in penetration and aspiration of thin liquids when taken in large sips from a cup.  2. Demonstrated ability to eliminate aspiration with compensatory strategy  3. No cervical esophageal swallowing abnormalities were noted.  4. Status post CVA      Reviewed:  Strategies/techniques:  1. Appropriate seating  2. Smaller size of sips and bites  3. Swallowing one sip and bite at a time  4. Dry swallows  5. Alternate liquid and solid swallows  6. Chin down position    Exercises:  1. Tongue base retraction  2. Tongue hold swallow (Aga)   3. Effortful swallow  4. Chin tuck against resistance      He was able to demonstrate adequate execution of all strategies and exercises.    Short-term objectives:  Mr. Perez will  1. Demonstrate understanding of functional/dysfunctional swallow. Goal met 5/3/2017.  2. Demonstrate understanding of dysfunction affecting patient's swallow. Goal met 5/3/2017  3. Perform prescribed swallowing exercises with % accuracy with clinician.  Goal met 5/3/2017  4. Perform home program. Patient has been using chin tuck with thin liquids and has seen significant reduction in signs of aspiration.  Goal met 5/3/2017; patient will continue use of strategy.  3. Use prescribed safe swallowing strategies % of the time by report and observation during therapy. Goal met. 5/3/2017         IMPRESSIONS:  1. Reduced evidence of aspiration on swallowing thin liquids using Chin Tuck strategy.  2. Motivated to perform strengthening exercises.       RECOMMENDATIONS/PLAN OF CARE:   It is felt that Mr. Perez would benefit from  1. Discharge from therapy at this time.  2.  Continued home program of use of safe swallowing strategies with all po intake and swallowing exercises 3x/day  3. Follow up with referring physician and/or PCP as needed for medical care/management.  4. Return to office for recheck in August when he is returning to [place] for cataract surgery in August.          Long-term goals:  Mr. Perez will be able to consume a regular diet with thin liquids with no signs/symptoms of aspiration.

## 2017-05-03 NOTE — MR AVS SNAPSHOT
[facility]  [address]  [place] [address]  Phone: [phone]                  Ar Perez   5/3/2017 3:00 PM   Clinical Support    Description:  Male : 1940   Provider:  Malissa Ames CCC-SLP   Department:  [facility]           Reason for Visit     SLP Treatment           Diagnoses this Visit        Comments    Cerebrovascular accident (CVA), unspecified mechanism         Pharyngeal dysphagia         Pharyngoesophageal dysphagia                To Do List           Future Appointments        Provider Department Dept Phone    2017 2:00 PM Noam Hooper MD [facility] - Ophthalmology [phone]      Goals (5 Years of Data)     None      [facility] On Call     [facility] Nurse Care [facility] -  Assistance  Unless otherwise directed by your provider, please contact [facility], our nurse care line that is available for  assistance.     Registered nurses in the [facility] provide: appointment scheduling, clinical advisement, health education, and other advisory services.  Call: [phone] (toll free)               Medications           Message regarding Medications     Verify the changes and/or additions to your medication regime listed below are the same as discussed with your clinician today.  If any of these changes or additions are incorrect, please notify your healthcare provider.             Verify that the below list of medications is an accurate representation of the medications you are currently taking.  If none reported, the list may be blank. If incorrect, please contact your healthcare provider. Carry this list with you in case of emergency.           Current Medications     aspirin 325 MG tablet Take 1 tablet (325 mg total) by mouth once daily.    atorvastatin (LIPITOR) 40 MG tablet Take 1 tablet (40 mg total) by mouth once daily.    cholecalciferol, vitamin D3, 50,000 unit capsule Take by mouth  once a week.    CYANOCOBALAMIN, VITAMIN B-12, (VITAMIN B-12 ORAL) Take 1 tablet by mouth once daily.    docusate sodium (COLACE) 100 MG capsule Take 1 capsule (100 mg total) by mouth 2 (two) times daily.    finasteride (PROSCAR) 5 mg tablet Take 1 tablet (5 mg total) by mouth once daily.    ibuprofen (ADVIL,MOTRIN) 800 MG tablet Take 1 tablet (800 mg total) by mouth every 6 (six) hours as needed for Pain.    losartan (COZAAR) 50 MG tablet Take 1 tablet (50 mg total) by mouth once daily.    multivitamin capsule Take 1 capsule by mouth once daily.    oxybutynin (DITROPAN-XL) 5 MG TR24 Take 1 tablet (5 mg total) by mouth once daily.    tadalafil (CIALIS) 5 MG tablet Take 1 tablet (5 mg total) by mouth Daily. Take half pill daily.    tamsulosin (FLOMAX) 0.4 mg Cp24 Take 1 capsule (0.4 mg total) by mouth once daily.           Clinical Reference Information           Allergies as of 5/3/2017     Bactrim [Sulfamethoxazole-trimethoprim]    Ambien [Zolpidem]      Immunizations Administered on Date of Encounter - 5/3/2017     None      Orders Placed During Today's Visit      Normal Orders This Visit    SLP treatment for swallowing       Instructions       IMPRESSIONS:  1. Reduced evidence of aspiration on swallowing thin liquids using Chin Tuck strategy.  2. Motivated to perform strengthening exercises.        RECOMMENDATIONS/PLAN OF CARE:   It is felt that Mr. Perez would benefit from  1. Discharge from therapy at this time.  2. Continued home program of use of safe swallowing strategies with all po intake and swallowing exercises 3x/day  3. Follow up with referring physician and/or PCP as needed for medical care/management.  4. Return to office for recheck in August when he is returning to [place] for cataract surgery in August.          Language Assistance Services     ATTENTION: Language assistance services are available, free of charge. Please call [phone].      ATENCIÓN: finn Arita  disposición servicios gratuitos de asistencia lingüística. Petey al [phone].     KIKE Ý: N?u b?n nói Ti?ng Vi?t, có các d?ch v? h? tr? ngôn ng? mi?n phí dành cho b?n. G?i s? [phone].         [facility] complies with applicable Federal civil rights laws and does not discriminate on the basis of race, color, national origin, age, disability, or sex.

## 2017-05-04 NOTE — PATIENT INSTRUCTIONS
IMPRESSIONS:  1. Reduced evidence of aspiration on swallowing thin liquids using Chin Tuck strategy.  2. Motivated to perform strengthening exercises.        RECOMMENDATIONS/PLAN OF CARE:   It is felt that Mr. Perez would benefit from  1. Discharge from therapy at this time.  2. Continued home program of use of safe swallowing strategies with all po intake and swallowing exercises 3x/day  3. Follow up with referring physician and/or PCP as needed for medical care/management.  4. Return to office for recheck in August when he is returning to [place] for cataract surgery in August.

## 2017-06-14 ENCOUNTER — TELEPHONE (OUTPATIENT)
Dept: OPHTHALMOLOGY | Facility: CLINIC | Age: 77
End: 2017-06-14

## 2017-06-14 DIAGNOSIS — H25.11 NUCLEAR SCLEROTIC CATARACT OF RIGHT EYE: Primary | ICD-10-CM

## 2017-07-05 ENCOUNTER — TELEPHONE (OUTPATIENT)
Dept: OPHTHALMOLOGY | Facility: CLINIC | Age: 77
End: 2017-07-05

## 2017-07-05 NOTE — TELEPHONE ENCOUNTER
----- Message from Rafia Romo sent at 7/5/2017 10:53 AM CDT -----  Contact: Ar  Pt calling to confirm [place]14/17?  He can be reached [phone]. I explained he will need to have a 1 day and a 1 week post op for certain, but I wasn't sure about the 1 mos post op.      Unable to reach pt by phone. Quick busy signal.  Sent email instructing if all goes well Dr. Hooper will see him Post op day 1 and 1 week later.   [facility]

## 2017-08-10 ENCOUNTER — TELEPHONE (OUTPATIENT)
Dept: OPHTHALMOLOGY | Facility: CLINIC | Age: 77
End: 2017-08-10

## 2017-08-10 ENCOUNTER — TELEPHONE (OUTPATIENT)
Dept: OPTOMETRY | Facility: CLINIC | Age: 77
End: 2017-08-10

## 2017-08-10 ENCOUNTER — OFFICE VISIT (OUTPATIENT)
Dept: INTERNAL MEDICINE | Facility: CLINIC | Age: 77
End: 2017-08-10

## 2017-08-10 DIAGNOSIS — I10 ESSENTIAL HYPERTENSION: Primary | ICD-10-CM

## 2017-08-10 DIAGNOSIS — I69.30 HISTORY OF STROKE WITH RESIDUAL DEFICIT: ICD-10-CM

## 2017-08-10 DIAGNOSIS — R13.13 PHARYNGEAL DYSPHAGIA: ICD-10-CM

## 2017-08-10 PROCEDURE — 99213 OFFICE O/P EST LOW 20 MIN: CPT | Mod: PBBFAC | Performed by: INTERNAL MEDICINE

## 2017-08-10 PROCEDURE — 99999 PR PBB SHADOW E&M-EST. PATIENT-LVL III: CPT | Mod: PBBFAC,,, | Performed by: INTERNAL MEDICINE

## 2017-08-10 PROCEDURE — 1159F MED LIST DOCD IN RCRD: CPT | Mod: ,,, | Performed by: INTERNAL MEDICINE

## 2017-08-10 PROCEDURE — 3008F BODY MASS INDEX DOCD: CPT | Mod: ,,, | Performed by: INTERNAL MEDICINE

## 2017-08-10 PROCEDURE — 99213 OFFICE O/P EST LOW 20 MIN: CPT | Mod: S$PBB,,, | Performed by: INTERNAL MEDICINE

## 2017-08-10 NOTE — TELEPHONE ENCOUNTER
----- Message from Toyin Ferrell sent at 8/10/2017 12:04 PM CDT -----  Contact: Patient  Patient stated that he was unaware of any drops he was supposed to have prior to upcoming surgery. Please give him a call, Thanks!

## 2017-08-10 NOTE — TELEPHONE ENCOUNTER
----- Message from Smith Alexandra sent at 8/10/2017  2:30 PM CDT -----  Contact: Ar  Mr. Perez states that he is schedule for surgery on Monday but doesn't have his drops. He can be reached at [phone]

## 2017-08-11 ENCOUNTER — OFFICE VISIT (OUTPATIENT)
Dept: OPHTHALMOLOGY | Facility: CLINIC | Age: 77
End: 2017-08-11

## 2017-08-11 DIAGNOSIS — H25.11 NUCLEAR SCLEROSIS, RIGHT: Primary | ICD-10-CM

## 2017-08-11 DIAGNOSIS — H26.493 POSTERIOR CAPSULAR OPACIFICATION, BILATERAL: ICD-10-CM

## 2017-08-11 PROCEDURE — 92012 INTRM OPH EXAM EST PATIENT: CPT | Mod: 57,S$PBB,, | Performed by: OPHTHALMOLOGY

## 2017-08-11 PROCEDURE — 92136 OPHTHALMIC BIOMETRY: CPT | Mod: PBBFAC,RT | Performed by: OPHTHALMOLOGY

## 2017-08-11 PROCEDURE — 99999 PR PBB SHADOW E&M-EST. PATIENT-LVL II: CPT | Mod: PBBFAC,,, | Performed by: OPHTHALMOLOGY

## 2017-08-11 PROCEDURE — 66821 AFTER CATARACT LASER SURGERY: CPT | Mod: S$PBB,LT,, | Performed by: OPHTHALMOLOGY

## 2017-08-11 PROCEDURE — 99212 OFFICE O/P EST SF 10 MIN: CPT | Mod: PBBFAC | Performed by: OPHTHALMOLOGY

## 2017-08-11 PROCEDURE — 66821 AFTER CATARACT LASER SURGERY: CPT | Mod: PBBFAC,LT | Performed by: OPHTHALMOLOGY

## 2017-08-11 RX ORDER — PHENYLEPHRINE HYDROCHLORIDE 25 MG/ML
1 SOLUTION/ DROPS OPHTHALMIC
Status: CANCELLED | OUTPATIENT
Start: 2017-08-11

## 2017-08-11 RX ORDER — TETRACAINE HYDROCHLORIDE 5 MG/ML
1 SOLUTION OPHTHALMIC
Status: CANCELLED | OUTPATIENT
Start: 2017-08-11

## 2017-08-11 RX ORDER — LIDOCAINE HYDROCHLORIDE 10 MG/ML
1 INJECTION, SOLUTION EPIDURAL; INFILTRATION; INTRACAUDAL; PERINEURAL ONCE
Status: CANCELLED | OUTPATIENT
Start: 2017-08-11 | End: 2017-08-11

## 2017-08-11 RX ORDER — TROPICAMIDE 10 MG/ML
1 SOLUTION/ DROPS OPHTHALMIC
Status: CANCELLED | OUTPATIENT
Start: 2017-08-11

## 2017-08-11 RX ORDER — MOXIFLOXACIN 5 MG/ML
1 SOLUTION/ DROPS OPHTHALMIC
Status: CANCELLED | OUTPATIENT
Start: 2017-08-11

## 2017-08-11 NOTE — PROGRESS NOTES
HPI     Cataract    Additional comments: Right Eye.            Blurred Vision    Additional comments: Left eye.            Comments   International patient presents for cataract pre-op OD.  YAG OS.  Pt states vision OD is very blurry.  Vision OS slightly hazy.   Given drops and all pre-op paperwork today.   FLOMAX PATIENT       Last edited by Magi Alexander on 8/11/2017  2:21 PM. (History)            Assessment /Plan     For exam results, see Encounter Report.    Nuclear sclerosis, right  -     IOL Master - MOD 26 - OD- Right eye    Posterior capsular opacification, bilateral      Visually Significant Cataract: Patient reports decreased vision consistent with the clinical amount of lenticular opacity, which reaches the level of visual significance and affects activities of daily living. Risks, benefits, and alternatives to cataract surgery were discussed and the consent reviewed. IOL options were discussed, including ATIOLs and the associated side effects and additional patient cost associated with them.     IOL Selections:   Right eye  IOL: pcboo 23.5        Assessment /Plan     For exam results, see Encounter Report.    Nuclear sclerosis, right  -     IOL Master - MOD 26 - OD- Right eye    Posterior capsular opacification, bilateral      Visually Significant Cataract: Patient reports decreased vision consistent with the clinical amount of lenticular opacity, which reaches the level of visual significance and affects activities of daily living. Risks, benefits, and alternatives to cataract surgery were discussed and the consent reviewed. IOL options were discussed, including ATIOLs and the associated side effects and additional patient cost associated with them.   IOL Selections:   Right eye  IOL: pcboo 23.5    Pt wishes to have right eye done first.    Visually significant posterior capsular opacity present.  Discussed risks, benefits, and alternatives to laser surgery.  YAG laser capsulotomy Procedure  Note:   Informed consent obtained and correct eye(s) verified with patient.  1 drop of topical Proparacaine and Iopidine instilled, and eye(s) dilated with 1% Tropicamide 2.5% Phenylephrine.  YAG laser applied to posterior capsule in cruciate pattern left  Patient tolerated procedure well. No complications. Follow up in 1 month/PRN.

## 2017-08-14 ENCOUNTER — SURGERY (OUTPATIENT)
Age: 77
End: 2017-08-14

## 2017-08-14 ENCOUNTER — HOSPITAL ENCOUNTER (OUTPATIENT)
Facility: OTHER | Age: 77
Discharge: HOME | End: 2017-08-14
Attending: OPHTHALMOLOGY | Admitting: OPHTHALMOLOGY

## 2017-08-14 ENCOUNTER — ANESTHESIA EVENT (OUTPATIENT)
Dept: SURGERY | Facility: OTHER | Age: 77
End: 2017-08-14

## 2017-08-14 ENCOUNTER — ANESTHESIA (OUTPATIENT)
Dept: SURGERY | Facility: OTHER | Age: 77
End: 2017-08-14

## 2017-08-14 VITALS
RESPIRATION RATE: 20 BRPM | HEART RATE: 68 BPM | HEIGHT: 69 IN | SYSTOLIC BLOOD PRESSURE: 161 MMHG | DIASTOLIC BLOOD PRESSURE: 74 MMHG | WEIGHT: 195 LBS | OXYGEN SATURATION: 98 % | BODY MASS INDEX: 28.88 KG/M2 | TEMPERATURE: 97 F

## 2017-08-14 DIAGNOSIS — H25.11 NUCLEAR SCLEROSIS, RIGHT: ICD-10-CM

## 2017-08-14 PROBLEM — H25.10 NUCLEAR SCLEROSIS: Status: ACTIVE | Noted: 2017-08-14

## 2017-08-14 PROCEDURE — 66984 XCAPSL CTRC RMVL W/O ECP: CPT | Mod: RT,,, | Performed by: OPHTHALMOLOGY

## 2017-08-14 PROCEDURE — V2632 POST CHMBR INTRAOCULAR LENS: HCPCS | Performed by: OPHTHALMOLOGY

## 2017-08-14 PROCEDURE — 36000707: Performed by: OPHTHALMOLOGY

## 2017-08-14 PROCEDURE — 37000008 HC ANESTHESIA 1ST 15 MINUTES: Performed by: OPHTHALMOLOGY

## 2017-08-14 PROCEDURE — 63600175 PHARM REV CODE 636 W HCPCS: Performed by: NURSE ANESTHETIST, CERTIFIED REGISTERED

## 2017-08-14 PROCEDURE — 25000003 PHARM REV CODE 250: Performed by: OPHTHALMOLOGY

## 2017-08-14 PROCEDURE — 37000009 HC ANESTHESIA EA ADD 15 MINS: Performed by: OPHTHALMOLOGY

## 2017-08-14 PROCEDURE — 71000015 HC POSTOP RECOV 1ST HR: Performed by: OPHTHALMOLOGY

## 2017-08-14 PROCEDURE — 36000706: Performed by: OPHTHALMOLOGY

## 2017-08-14 DEVICE — IMPLANTABLE DEVICE: Type: IMPLANTABLE DEVICE | Site: EYE | Status: FUNCTIONAL

## 2017-08-14 RX ORDER — TETRACAINE HYDROCHLORIDE 5 MG/ML
1 SOLUTION OPHTHALMIC
Status: COMPLETED | OUTPATIENT
Start: 2017-08-14 | End: 2017-08-14

## 2017-08-14 RX ORDER — MIDAZOLAM HYDROCHLORIDE 1 MG/ML
INJECTION INTRAMUSCULAR; INTRAVENOUS
Status: DISCONTINUED | OUTPATIENT
Start: 2017-08-14 | End: 2017-08-14

## 2017-08-14 RX ORDER — MOXIFLOXACIN 5 MG/ML
1 SOLUTION/ DROPS OPHTHALMIC
Status: COMPLETED | OUTPATIENT
Start: 2017-08-14 | End: 2017-08-14

## 2017-08-14 RX ORDER — LIDOCAINE HYDROCHLORIDE 40 MG/ML
INJECTION, SOLUTION RETROBULBAR
Status: DISCONTINUED | OUTPATIENT
Start: 2017-08-14 | End: 2017-08-14 | Stop reason: HOSPADM

## 2017-08-14 RX ORDER — PHENYLEPHRINE HYDROCHLORIDE 25 MG/ML
1 SOLUTION/ DROPS OPHTHALMIC
Status: COMPLETED | OUTPATIENT
Start: 2017-08-14 | End: 2017-08-14

## 2017-08-14 RX ORDER — MOXIFLOXACIN 5 MG/ML
SOLUTION/ DROPS OPHTHALMIC
Status: DISCONTINUED | OUTPATIENT
Start: 2017-08-14 | End: 2017-08-14 | Stop reason: HOSPADM

## 2017-08-14 RX ORDER — TROPICAMIDE 10 MG/ML
1 SOLUTION/ DROPS OPHTHALMIC
Status: COMPLETED | OUTPATIENT
Start: 2017-08-14 | End: 2017-08-14

## 2017-08-14 RX ORDER — TETRACAINE HYDROCHLORIDE 5 MG/ML
SOLUTION OPHTHALMIC
Status: DISCONTINUED | OUTPATIENT
Start: 2017-08-14 | End: 2017-08-14 | Stop reason: HOSPADM

## 2017-08-14 RX ORDER — PROPARACAINE HYDROCHLORIDE 5 MG/ML
1 SOLUTION/ DROPS OPHTHALMIC
Status: DISCONTINUED | OUTPATIENT
Start: 2017-08-14 | End: 2017-08-14 | Stop reason: HOSPADM

## 2017-08-14 RX ORDER — ACETAMINOPHEN 325 MG/1
650 TABLET ORAL EVERY 4 HOURS PRN
Status: DISCONTINUED | OUTPATIENT
Start: 2017-08-14 | End: 2017-08-14 | Stop reason: HOSPADM

## 2017-08-14 RX ORDER — LIDOCAINE HYDROCHLORIDE 10 MG/ML
1 INJECTION, SOLUTION EPIDURAL; INFILTRATION; INTRACAUDAL; PERINEURAL ONCE
Status: DISCONTINUED | OUTPATIENT
Start: 2017-08-14 | End: 2017-08-14 | Stop reason: HOSPADM

## 2017-08-14 RX ADMIN — TROPICAMIDE 1 DROP: 10 SOLUTION/ DROPS OPHTHALMIC at 11:08

## 2017-08-14 RX ADMIN — MOXIFLOXACIN HYDROCHLORIDE 1 DROP: 5 SOLUTION/ DROPS OPHTHALMIC at 01:08

## 2017-08-14 RX ADMIN — TETRACAINE HYDROCHLORIDE 1 DROP: 5 SOLUTION OPHTHALMIC at 01:08

## 2017-08-14 RX ADMIN — SODIUM CHONDROITIN SULFATE / SODIUM HYALURONATE 0.5 ML: 0.55-0.5 INJECTION INTRAOCULAR at 01:08

## 2017-08-14 RX ADMIN — PHENYLEPHRINE HYDROCHLORIDE 1 DROP: 25 SOLUTION/ DROPS OPHTHALMIC at 11:08

## 2017-08-14 RX ADMIN — TETRACAINE HYDROCHLORIDE 1 DROP: 5 SOLUTION OPHTHALMIC at 12:08

## 2017-08-14 RX ADMIN — BALANCED SALT SOLUTION ENRICHED WITH BICARBONATE, DEXTROSE, AND GLUTATHIONE 500 ML: KIT at 01:08

## 2017-08-14 RX ADMIN — TROPICAMIDE 1 DROP: 10 SOLUTION/ DROPS OPHTHALMIC at 12:08

## 2017-08-14 RX ADMIN — PHENYLEPHRINE HYDROCHLORIDE 1 DROP: 25 SOLUTION/ DROPS OPHTHALMIC at 12:08

## 2017-08-14 RX ADMIN — MIDAZOLAM HYDROCHLORIDE 1 MG: 1 INJECTION, SOLUTION INTRAMUSCULAR; INTRAVENOUS at 01:08

## 2017-08-14 RX ADMIN — LIDOCAINE HYDROCHLORIDE 4 DROP: 40 INJECTION, SOLUTION RETROBULBAR; TOPICAL at 01:08

## 2017-08-14 RX ADMIN — TETRACAINE HYDROCHLORIDE 1 DROP: 5 SOLUTION OPHTHALMIC at 11:08

## 2017-08-14 RX ADMIN — MOXIFLOXACIN HYDROCHLORIDE 1 DROP: 5 SOLUTION/ DROPS OPHTHALMIC at 11:08

## 2017-08-14 RX ADMIN — MOXIFLOXACIN HYDROCHLORIDE 1 DROP: 5 SOLUTION/ DROPS OPHTHALMIC at 12:08

## 2017-08-14 NOTE — PLAN OF CARE
Patient prefers to have wife Esperanza present for discharge teaching. Please contact them @[phone].

## 2017-08-14 NOTE — DISCHARGE INSTRUCTIONS
Noam Hooper MD  [facility]  Department of Ophthalmology      AFTER: Cataract Surgery:  Relax at home and DO NOT exert yourself for the rest of the day.  Plan to see Dr. Hooper tomorrow at the eye clinic:   [facility]  [place] [address]    Refer to attached eye drop instruction sheet     Precautions:  DO NOT rub your eye.  You may resume moderate activity the day after surgery.  Wear protective sunglasses during the day and a shield at night for 1(one) week.  If you have pain, redness and decreased vision, call Dr. Hooper (or the on-call doctor after hours) @[phone].            Home Care Instructions for Eye Surgeries    1. ACTIVITY:  Limit your activity today. Relax at home and DO NOT exert yourself for the rest of the day. Increase activity gradually. You may return to work or school as directed by your physician.    2. DIET:  Drink plenty of fluids. Resume your normal diet unless instructed otherwise.    3. PAIN:  Expect a moderate amount of pain. If a prescription for pain is not sent home with you, you may take your commonly used pain reliever as directed. If this is not sufficient, call your physician. You may resume any other prescription medication unless otherwise directed by your physician.     Discuss any problem with your physician as soon as it arises. Do not Delay.      EMERGENCY- If you are unable to contact your physician, please go to the nearest Emergency Room.       Anesthesia: Monitored Anesthesia Care (MAC)    Anesthesia Safety  · Have an adult family member or friend drive you home after the procedure.  · For the first 24 hours after your surgery:  · Do not drive or use heavy equipment.  · Do not make important decisions or sign documents.  · Avoid alcohol.  · Have someone stay with you, if possible. They can watch for problems and help keep you safe.

## 2017-08-14 NOTE — OP NOTE
SURGEON:  Noam Hooper M.D.    PREOPERATIVE DIAGNOSIS:    Nuclear Sclerotic Cataract Right Eye    POSTOPERATIVE DIAGNOSIS:    Nuclear Sclerotic Cataract Right Eye    PROCEDURES:    Phacoemulsification with  intraocular lens, Right eye ([id number])    DATE OF SURGERY: 08/14/2017    IMPLANT: pcboo 23.5    ANESTHESIA:  MAC with topical Lidocaine    COMPLICATIONS:  None    ESTIMATED BLOOD LOSS: None    SPECIMENS: None    INDICATIONS:    The patient has a history of painless progressive visual loss and  difficulty with activities of daily living secondary to cataract formation.  After a thorough discussion of the risks, benefits, and alternatives to cataract surgery, including, but not limited to, the rare risks of infection, retinal detachment, hemorrhage, need for additional surgery, loss of vision, and even loss of the eye, the patient voices understanding and desires to proceed.    DESCRIPTION OF PROCEDURE:    The patients IOL calculations were reviewed, and the lens selection confirmed.   After verification and marking of the proper eye in the preop holding area, the patient was brought to the operating room in supine position where the eye was prepped and draped in standard sterile fashion with 5% Betadine and a lid speculum placed in the eye.   Topical 4% Lidocaine was used in addition to the preoperative anesthesia and the procedure was begun by the creation of a paracentesis incision through which viscoelastic was used to fill the anterior chamber.  Next, a keratome blade was used to create a triplanar temporal clear corneal incision and a cystotome and Utrata forceps used to fashion a continuous curvilinear capsulorrhexis.  Hydrodissection was carried out using the Russo hydrodissection cannula and the nucleus was found to be mobile.  Phacoemulsification of the nucleus was carried out using a quick chop technique, and all remaining epinuclear and cortical material was removed.  The eye was then reformed with  Viscoelastic and the  intraocular lens was implanted into the capsular bag.  All remaining viscoelastics were removed from the eye and at the end of the case the pupil was round, the lens was well-centered within the capsular bag and all wounds were found to be water tight.  Drops of Vigamox and Pred Forte were instilled and a shield was placed over the eye. The patient will follow up with Dr. Hooper in the morning.

## 2017-08-14 NOTE — PLAN OF CARE
Ar Frenchy Chris has met all discharge criteria from Phase II. Vital Signs are stable, ambulating  without difficulty. Discharge instructions given, patient verbalized understanding. Discharged from facility via wheelchair in stable condition.

## 2017-08-14 NOTE — ANESTHESIA POSTPROCEDURE EVALUATION
"Anesthesia Post Evaluation    Patient: Ar Perez    Procedure(s) Performed: Procedure(s) (LRB):  PHACOEMULSIFICATION-ASPIRATION-CATARACT (Right)  INSERTION-INTRAOCULAR LENS (IOL) (Right)    Final Anesthesia Type: MAC  Patient location during evaluation: [facility]  Patient participation: Yes- Able to Participate  Level of consciousness: awake and alert  Post-procedure vital signs: reviewed and stable  Pain management: adequate  Airway patency: patent  PONV status at discharge: No PONV  Anesthetic complications: no      Cardiovascular status: blood pressure returned to baseline  Respiratory status: unassisted  Hydration status: euvolemic  Follow-up not needed.        Visit Vitals  BP (!) 174/69 (BP Location: Right arm, Patient Position: Lying)   Pulse (!) 59   Temp 36.7 °C (98.1 °F) (Oral)   Resp 16   Ht 5' 9" (1.753 m)   Wt 88.5 kg (195 lb)   SpO2 99%   BMI 28.80 kg/m²       Pain/Jeancarlos Score: Pain Assessment Performed: Yes (8/14/2017 12:07 PM)  Presence of Pain: denies (8/14/2017 12:07 PM)      "

## 2017-08-14 NOTE — ANESTHESIA PREPROCEDURE EVALUATION
08/14/2017  Ar Perez is a 77 y.o., male.    Anesthesia Evaluation    I have reviewed the Patient Summary Reports.    I have reviewed the Nursing Notes.   I have reviewed the Medications.     Review of Systems  Anesthesia Hx:  History of prior surgery of interest to airway management or planning: Previous anesthesia: General 6/16 PEG with general anesthesia.  Airway issues documented on chart review include GETA, easy direct laryngoscopy , view on direct laryngoscopy Grade I  Denies Family Hx of Anesthesia complications.  Personal Hx of Anesthesia complications, Post-Operative Nausea/Vomiting, in the past, but not with recent anesthetics / prophylaxis   Social:  Non-Smoker    Hematology/Oncology:  Hematology Normal   Oncology Normal     EENT/Dental:EENT/Dental Normal   Cardiovascular:   Hypertension    Pulmonary:  Pulmonary Normal    Renal/:   BPH    Hepatic/GI:  Hepatic/GI Normal    Musculoskeletal:  Musculoskeletal Normal    Neurological:   CVA (dysphagia), residual symptoms    Endocrine:  Endocrine Normal    Dermatological:  Skin Normal    Psych:  Psychiatric Normal           Physical Exam  General:  Well nourished      Dental:  Dental Findings: In tact        Mental Status:  Mental Status Findings:  Cooperative, Alert and Oriented         Anesthesia Plan  Type of Anesthesia, risks & benefits discussed:  Anesthesia Type:  MAC  Patient's Preference:   Intra-op Monitoring Plan: standard ASA monitors  Intra-op Monitoring Plan Comments:   Post Op Pain Control Plan:   Post Op Pain Control Plan Comments:   Induction:    Beta Blocker:         Informed Consent: Patient understands risks and agrees with Anesthesia plan.  Questions answered. Anesthesia consent signed with patient.  ASA Score: 3     Day of Surgery Review of History & Physical:    H&P update referred to the surgeon.         Ready For  Surgery From Anesthesia Perspective.

## 2017-08-14 NOTE — DISCHARGE SUMMARY
Outcome: Successful outpatient ophthalmic surgical procedure  Preprinted Instructions given to patient.  Regular diet.  Activity: No restrictions  Meds: see Med Rec  Condition: stable  Follow up: 1 day with Dr Hooper  Disposition: Home  Diagnosis: s/p eye surgery

## 2017-08-15 ENCOUNTER — OFFICE VISIT (OUTPATIENT)
Dept: OPHTHALMOLOGY | Facility: CLINIC | Age: 77
End: 2017-08-15
Attending: OPHTHALMOLOGY

## 2017-08-15 DIAGNOSIS — Z98.890 POST-OPERATIVE STATE: Primary | ICD-10-CM

## 2017-08-15 DIAGNOSIS — H25.11 NUCLEAR SCLEROSIS, RIGHT: ICD-10-CM

## 2017-08-15 PROCEDURE — 99024 POSTOP FOLLOW-UP VISIT: CPT | Mod: ,,, | Performed by: OPHTHALMOLOGY

## 2017-08-15 PROCEDURE — 99999 PR PBB SHADOW E&M-EST. PATIENT-LVL II: CPT | Mod: PBBFAC,,, | Performed by: OPHTHALMOLOGY

## 2017-08-15 PROCEDURE — 99212 OFFICE O/P EST SF 10 MIN: CPT | Mod: PBBFAC | Performed by: OPHTHALMOLOGY

## 2017-08-15 NOTE — PROGRESS NOTES
HPI     POD 1 PC IOL OD 8/14/17      Pt states his VA OD is good.  No pain.    Eye meds:  P/g/N  TID        Last edited by Esther Monroe on 8/15/2017  1:22 PM. (History)            Assessment /Plan     For exam results, see Encounter Report.    Post-operative state    Nuclear sclerosis, right      Slit lamp exam:  L/L: nl  K: clear, wound sealed  AC: 1+ cell  Lens: IOL centered and stable    POD1 s/p Phaco/IOL  Appropriate precautions and post op medications reviewed.  Patient instructed to call or come in if symptoms of redness, decreased vision, or pain are experienced.

## 2017-08-18 DIAGNOSIS — E78.5 HYPERLIPIDEMIA, UNSPECIFIED HYPERLIPIDEMIA TYPE: Primary | ICD-10-CM

## 2017-08-18 RX ORDER — ATORVASTATIN CALCIUM 40 MG/1
40 TABLET, FILM COATED ORAL DAILY
Qty: 90 TABLET | Refills: 3 | Status: SHIPPED | OUTPATIENT
Start: 2017-08-18 | End: 2019-03-06

## 2017-08-20 ENCOUNTER — HOSPITAL ENCOUNTER (OUTPATIENT)
Facility: HOSPITAL | Age: 77
Discharge: HOME | End: 2017-08-21
Attending: EMERGENCY MEDICINE | Admitting: HOSPITALIST

## 2017-08-20 DIAGNOSIS — E78.2 MIXED HYPERLIPIDEMIA: ICD-10-CM

## 2017-08-20 DIAGNOSIS — I16.1 HYPERTENSIVE EMERGENCY: Primary | ICD-10-CM

## 2017-08-20 DIAGNOSIS — R79.89 ELEVATED TROPONIN: ICD-10-CM

## 2017-08-20 DIAGNOSIS — R07.9 CHEST PAIN, UNSPECIFIED TYPE: ICD-10-CM

## 2017-08-20 DIAGNOSIS — I10 HYPERTENSION: ICD-10-CM

## 2017-08-20 DIAGNOSIS — I69.30 HISTORY OF STROKE WITH RESIDUAL DEFICIT: ICD-10-CM

## 2017-08-20 DIAGNOSIS — N40.0 BENIGN PROSTATIC HYPERPLASIA WITHOUT LOWER URINARY TRACT SYMPTOMS: ICD-10-CM

## 2017-08-20 DIAGNOSIS — I10 ESSENTIAL HYPERTENSION: ICD-10-CM

## 2017-08-20 LAB
ALBUMIN SERPL BCP-MCNC: 3.6 G/DL
ALP SERPL-CCNC: 108 U/L
ALT SERPL W/O P-5'-P-CCNC: 21 U/L
ANION GAP SERPL CALC-SCNC: 9 MMOL/L
AST SERPL-CCNC: 19 U/L
BASOPHILS # BLD AUTO: 0.09 K/UL
BASOPHILS NFR BLD: 1.1 %
BILIRUB SERPL-MCNC: 0.4 MG/DL
BNP SERPL-MCNC: 43 PG/ML
BUN SERPL-MCNC: 14 MG/DL
CALCIUM SERPL-MCNC: 9.2 MG/DL
CHLORIDE SERPL-SCNC: 106 MMOL/L
CO2 SERPL-SCNC: 27 MMOL/L
CREAT SERPL-MCNC: 1.2 MG/DL
DIFFERENTIAL METHOD: NORMAL
EOSINOPHIL # BLD AUTO: 0.2 K/UL
EOSINOPHIL NFR BLD: 2.9 %
ERYTHROCYTE [DISTWIDTH] IN BLOOD BY AUTOMATED COUNT: 13.4 %
EST. GFR  (AFRICAN AMERICAN): >60 ML/MIN/1.73 M^2
EST. GFR  (NON AFRICAN AMERICAN): 57 ML/MIN/1.73 M^2
GLUCOSE SERPL-MCNC: 114 MG/DL
HCT VFR BLD AUTO: 43 %
HGB BLD-MCNC: 14.7 G/DL
LYMPHOCYTES # BLD AUTO: 2.2 K/UL
LYMPHOCYTES NFR BLD: 26.9 %
MCH RBC QN AUTO: 30.2 PG
MCHC RBC AUTO-ENTMCNC: 34.2 G/DL
MCV RBC AUTO: 89 FL
MONOCYTES # BLD AUTO: 0.9 K/UL
MONOCYTES NFR BLD: 10.2 %
NEUTROPHILS # BLD AUTO: 4.9 K/UL
NEUTROPHILS NFR BLD: 58.3 %
PLATELET # BLD AUTO: 199 K/UL
PMV BLD AUTO: 10.8 FL
POTASSIUM SERPL-SCNC: 4.5 MMOL/L
PROT SERPL-MCNC: 6.5 G/DL
RBC # BLD AUTO: 4.86 M/UL
SODIUM SERPL-SCNC: 142 MMOL/L
TROPONIN I SERPL DL<=0.01 NG/ML-MCNC: 0.05 NG/ML
WBC # BLD AUTO: 8.34 K/UL

## 2017-08-20 PROCEDURE — 96374 THER/PROPH/DIAG INJ IV PUSH: CPT

## 2017-08-20 PROCEDURE — 25000003 PHARM REV CODE 250: Performed by: EMERGENCY MEDICINE

## 2017-08-20 PROCEDURE — 83880 ASSAY OF NATRIURETIC PEPTIDE: CPT

## 2017-08-20 PROCEDURE — 96376 TX/PRO/DX INJ SAME DRUG ADON: CPT

## 2017-08-20 PROCEDURE — 93005 ELECTROCARDIOGRAM TRACING: CPT

## 2017-08-20 PROCEDURE — 85025 COMPLETE CBC W/AUTO DIFF WBC: CPT

## 2017-08-20 PROCEDURE — 93010 ELECTROCARDIOGRAM REPORT: CPT | Mod: ,,, | Performed by: INTERNAL MEDICINE

## 2017-08-20 PROCEDURE — 84484 ASSAY OF TROPONIN QUANT: CPT

## 2017-08-20 PROCEDURE — 80053 COMPREHEN METABOLIC PANEL: CPT

## 2017-08-20 PROCEDURE — G0378 HOSPITAL OBSERVATION PER HR: HCPCS

## 2017-08-20 PROCEDURE — 99285 EMERGENCY DEPT VISIT HI MDM: CPT | Mod: 25

## 2017-08-20 RX ORDER — LABETALOL HYDROCHLORIDE 5 MG/ML
10 INJECTION, SOLUTION INTRAVENOUS
Status: COMPLETED | OUTPATIENT
Start: 2017-08-20 | End: 2017-08-21

## 2017-08-20 RX ORDER — TRAZODONE HYDROCHLORIDE 50 MG/1
50 TABLET ORAL NIGHTLY
COMMUNITY
End: 2018-05-15 | Stop reason: SDUPTHER

## 2017-08-20 RX ORDER — ASPIRIN 325 MG
325 TABLET ORAL
Status: COMPLETED | OUTPATIENT
Start: 2017-08-20 | End: 2017-08-20

## 2017-08-20 RX ORDER — LABETALOL HYDROCHLORIDE 5 MG/ML
10 INJECTION, SOLUTION INTRAVENOUS
Status: COMPLETED | OUTPATIENT
Start: 2017-08-20 | End: 2017-08-20

## 2017-08-20 RX ADMIN — LABETALOL HYDROCHLORIDE 10 MG: 5 INJECTION, SOLUTION INTRAVENOUS at 10:08

## 2017-08-20 RX ADMIN — ASPIRIN 325 MG ORAL TABLET 325 MG: 325 PILL ORAL at 10:08

## 2017-08-21 VITALS
TEMPERATURE: 97 F | OXYGEN SATURATION: 98 % | HEART RATE: 70 BPM | HEIGHT: 69 IN | BODY MASS INDEX: 29.06 KG/M2 | DIASTOLIC BLOOD PRESSURE: 51 MMHG | RESPIRATION RATE: 18 BRPM | SYSTOLIC BLOOD PRESSURE: 104 MMHG | WEIGHT: 196.19 LBS

## 2017-08-21 PROBLEM — R79.89 ELEVATED TROPONIN: Status: ACTIVE | Noted: 2017-08-21

## 2017-08-21 PROBLEM — I16.1 HYPERTENSIVE EMERGENCY: Status: RESOLVED | Noted: 2017-08-20 | Resolved: 2017-08-21

## 2017-08-21 PROBLEM — E78.2 MIXED HYPERLIPIDEMIA: Status: ACTIVE | Noted: 2017-08-21

## 2017-08-21 PROBLEM — I69.30 HISTORY OF STROKE WITH RESIDUAL DEFICIT: Status: ACTIVE | Noted: 2017-08-21

## 2017-08-21 LAB
CRP SERPL-MCNC: 0.79 MG/L
DIASTOLIC DYSFUNCTION: NO
DIASTOLIC DYSFUNCTION: NO
ERYTHROCYTE [SEDIMENTATION RATE] IN BLOOD BY WESTERGREN METHOD: 2 MM/HR
ESTIMATED AVG GLUCOSE: 114 MG/DL
ESTIMATED PA SYSTOLIC PRESSURE: 22.01
GLOBAL PERICARDIAL EFFUSION: NORMAL
HBA1C MFR BLD HPLC: 5.6 %
MITRAL VALVE REGURGITATION: NORMAL
RETIRED EF AND QEF - SEE NOTES: 50 (ref 55–65)
T3FREE SERPL-MCNC: 2.5 PG/ML
T4 FREE SERPL-MCNC: 0.93 NG/DL
TRICUSPID VALVE REGURGITATION: NORMAL
TROPONIN I SERPL DL<=0.01 NG/ML-MCNC: 0.26 NG/ML
TROPONIN I SERPL DL<=0.01 NG/ML-MCNC: 0.38 NG/ML
TSH SERPL DL<=0.005 MIU/L-ACNC: 1.85 UIU/ML

## 2017-08-21 PROCEDURE — 84481 FREE ASSAY (FT-3): CPT

## 2017-08-21 PROCEDURE — 25000003 PHARM REV CODE 250: Performed by: HOSPITALIST

## 2017-08-21 PROCEDURE — 36415 COLL VENOUS BLD VENIPUNCTURE: CPT

## 2017-08-21 PROCEDURE — 86141 C-REACTIVE PROTEIN HS: CPT

## 2017-08-21 PROCEDURE — 84439 ASSAY OF FREE THYROXINE: CPT

## 2017-08-21 PROCEDURE — G0378 HOSPITAL OBSERVATION PER HR: HCPCS

## 2017-08-21 PROCEDURE — 63600175 PHARM REV CODE 636 W HCPCS

## 2017-08-21 PROCEDURE — 93018 CV STRESS TEST I&R ONLY: CPT | Mod: ,,, | Performed by: INTERNAL MEDICINE

## 2017-08-21 PROCEDURE — 85651 RBC SED RATE NONAUTOMATED: CPT

## 2017-08-21 PROCEDURE — 93306 TTE W/DOPPLER COMPLETE: CPT | Mod: 26,,, | Performed by: INTERNAL MEDICINE

## 2017-08-21 PROCEDURE — 93017 CV STRESS TEST TRACING ONLY: CPT

## 2017-08-21 PROCEDURE — 83036 HEMOGLOBIN GLYCOSYLATED A1C: CPT

## 2017-08-21 PROCEDURE — 25000003 PHARM REV CODE 250: Performed by: EMERGENCY MEDICINE

## 2017-08-21 PROCEDURE — 84484 ASSAY OF TROPONIN QUANT: CPT

## 2017-08-21 PROCEDURE — 99244 OFF/OP CNSLTJ NEW/EST MOD 40: CPT | Mod: 25,,, | Performed by: INTERNAL MEDICINE

## 2017-08-21 PROCEDURE — 94761 N-INVAS EAR/PLS OXIMETRY MLT: CPT

## 2017-08-21 PROCEDURE — 93306 TTE W/DOPPLER COMPLETE: CPT

## 2017-08-21 PROCEDURE — 84443 ASSAY THYROID STIM HORMONE: CPT

## 2017-08-21 PROCEDURE — 93016 CV STRESS TEST SUPVJ ONLY: CPT | Mod: ,,, | Performed by: INTERNAL MEDICINE

## 2017-08-21 PROCEDURE — 78452 HT MUSCLE IMAGE SPECT MULT: CPT | Mod: 26,,, | Performed by: INTERNAL MEDICINE

## 2017-08-21 RX ORDER — MORPHINE SULFATE 10 MG/ML
4 INJECTION INTRAMUSCULAR; INTRAVENOUS; SUBCUTANEOUS EVERY 4 HOURS PRN
Status: DISCONTINUED | OUTPATIENT
Start: 2017-08-21 | End: 2017-08-21 | Stop reason: HOSPADM

## 2017-08-21 RX ORDER — LOSARTAN POTASSIUM 25 MG/1
50 TABLET ORAL DAILY
Status: DISCONTINUED | OUTPATIENT
Start: 2017-08-21 | End: 2017-08-21 | Stop reason: HOSPADM

## 2017-08-21 RX ORDER — TRAZODONE HYDROCHLORIDE 50 MG/1
50 TABLET ORAL NIGHTLY
Status: DISCONTINUED | OUTPATIENT
Start: 2017-08-21 | End: 2017-08-21 | Stop reason: HOSPADM

## 2017-08-21 RX ORDER — LABETALOL HYDROCHLORIDE 5 MG/ML
10 INJECTION, SOLUTION INTRAVENOUS EVERY 4 HOURS PRN
Status: DISCONTINUED | OUTPATIENT
Start: 2017-08-21 | End: 2017-08-21 | Stop reason: HOSPADM

## 2017-08-21 RX ORDER — REGADENOSON 0.08 MG/ML
INJECTION, SOLUTION INTRAVENOUS
Status: DISCONTINUED
Start: 2017-08-21 | End: 2017-08-21 | Stop reason: HOSPADM

## 2017-08-21 RX ORDER — AMLODIPINE BESYLATE 10 MG/1
10 TABLET ORAL DAILY
Qty: 30 TABLET | Refills: 3 | Status: SHIPPED | OUTPATIENT
Start: 2017-08-21 | End: 2018-05-07

## 2017-08-21 RX ORDER — ACETAMINOPHEN 325 MG/1
650 TABLET ORAL EVERY 8 HOURS PRN
Status: DISCONTINUED | OUTPATIENT
Start: 2017-08-21 | End: 2017-08-21 | Stop reason: HOSPADM

## 2017-08-21 RX ORDER — HYDRALAZINE HYDROCHLORIDE 20 MG/ML
10 INJECTION INTRAMUSCULAR; INTRAVENOUS EVERY 6 HOURS PRN
Status: DISCONTINUED | OUTPATIENT
Start: 2017-08-21 | End: 2017-08-21 | Stop reason: HOSPADM

## 2017-08-21 RX ORDER — FINASTERIDE 5 MG/1
5 TABLET, FILM COATED ORAL DAILY
Status: DISCONTINUED | OUTPATIENT
Start: 2017-08-21 | End: 2017-08-21 | Stop reason: HOSPADM

## 2017-08-21 RX ORDER — ONDANSETRON 2 MG/ML
4 INJECTION INTRAMUSCULAR; INTRAVENOUS EVERY 4 HOURS PRN
Status: DISCONTINUED | OUTPATIENT
Start: 2017-08-21 | End: 2017-08-21

## 2017-08-21 RX ORDER — ONDANSETRON 2 MG/ML
8 INJECTION INTRAMUSCULAR; INTRAVENOUS EVERY 8 HOURS PRN
Status: DISCONTINUED | OUTPATIENT
Start: 2017-08-21 | End: 2017-08-21 | Stop reason: HOSPADM

## 2017-08-21 RX ORDER — FLUTICASONE PROPIONATE 50 MCG
1 SPRAY, SUSPENSION (ML) NASAL NIGHTLY
COMMUNITY

## 2017-08-21 RX ORDER — TAMSULOSIN HYDROCHLORIDE 0.4 MG/1
0.4 CAPSULE ORAL DAILY
Status: DISCONTINUED | OUTPATIENT
Start: 2017-08-21 | End: 2017-08-21 | Stop reason: HOSPADM

## 2017-08-21 RX ORDER — AMLODIPINE BESYLATE 5 MG/1
10 TABLET ORAL DAILY
Status: DISCONTINUED | OUTPATIENT
Start: 2017-08-21 | End: 2017-08-21 | Stop reason: HOSPADM

## 2017-08-21 RX ORDER — ASPIRIN 325 MG
325 TABLET ORAL DAILY
Status: DISCONTINUED | OUTPATIENT
Start: 2017-08-21 | End: 2017-08-21 | Stop reason: HOSPADM

## 2017-08-21 RX ORDER — ENOXAPARIN SODIUM 100 MG/ML
40 INJECTION SUBCUTANEOUS EVERY 24 HOURS
Status: DISCONTINUED | OUTPATIENT
Start: 2017-08-21 | End: 2017-08-21 | Stop reason: HOSPADM

## 2017-08-21 RX ORDER — ATORVASTATIN CALCIUM 40 MG/1
40 TABLET, FILM COATED ORAL DAILY
Status: DISCONTINUED | OUTPATIENT
Start: 2017-08-21 | End: 2017-08-21 | Stop reason: HOSPADM

## 2017-08-21 RX ADMIN — NITROGLYCERIN 2 INCH: 20 OINTMENT TOPICAL at 06:08

## 2017-08-21 RX ADMIN — FINASTERIDE 5 MG: 5 TABLET, FILM COATED ORAL at 08:08

## 2017-08-21 RX ADMIN — ATORVASTATIN CALCIUM 40 MG: 40 TABLET, FILM COATED ORAL at 08:08

## 2017-08-21 RX ADMIN — AMLODIPINE BESYLATE 10 MG: 5 TABLET ORAL at 05:08

## 2017-08-21 RX ADMIN — TAMSULOSIN HYDROCHLORIDE 0.4 MG: 0.4 CAPSULE ORAL at 08:08

## 2017-08-21 RX ADMIN — LOSARTAN POTASSIUM 50 MG: 25 TABLET, FILM COATED ORAL at 08:08

## 2017-08-21 RX ADMIN — ASPIRIN 325 MG ORAL TABLET 325 MG: 325 PILL ORAL at 08:08

## 2017-08-21 RX ADMIN — LABETALOL HYDROCHLORIDE 10 MG: 5 INJECTION, SOLUTION INTRAVENOUS at 12:08

## 2017-08-21 NOTE — HPI
"Ar Perez is a 77 y.o. male that (in part)  has a past medical history of Arthritis; BPH (benign prostatic hypertrophy) with urinary obstruction; Cataract; Essential hypertension; Hypertension; PONV (postoperative nausea and vomiting); Stroke; and Urinary tract infection. Presents to [facility] complaining of mild substernal nonradiating chest pain associated with elevated blood pressure.  The patient reports he woke up this morning and checked his blood pressure which was much higher than normal. He reports only drinking "a little more coffee than usual".  He subsequently checked his pressure three more times before reaching systolic in the 190s were measured.  At around 2 PM he began to feel subacute onset of pain left sided chest pain.  He took 100 mg of Cozaar today prior to arrival. Chest pain is exacerbated with respiration and exertion. Pt denies nausea, vomiting, and abdominal pain.      Patient lives in [facility].  Last year (summer 2016), the patient had an acute left-sided CVA which required TPA in [facility] and further transfer to [facility] for care.  He initially had significant weakness of his right side, but this has improved with physical therapy.  He still has some right lower extremity weakness.  He needed a PEG for 5 days, but it was removed after one month.    In the emergency department routine laboratory studies, EKG, cardiac enzymes were obtained.  His blood pressures markedly elevated.  Patient was given IV labetalol ×2 doses with significant improvement in BP.  Initial /83, . Final ED /70, .  Given his mildly elevated troponin, the patient requires observation stay to trend and to further control BP.  This was discussed with the patient and his family members and they are agreeable.      Hospital medicine has been asked to admit for further evaluation and treatment.     "

## 2017-08-21 NOTE — PLAN OF CARE
"TN completed discharge needs assessment. TN provided and reviewed with patient "Blue My Health Packet" , "Help At Home" and "Discharge Planning Begins on Admission" handouts. TN discussed with patient the things the patient is responsible for to manage patient's  healthcare at home. Patient verbalized understanding & teachback implemented. Patient prefers morning doctor appointments.    TN reviewed S&S of HTN, with teach back with patient.     08/21/17 0917   Discharge Assessment   Assessment Type Discharge Planning Assessment   Confirmed/corrected address and phone number on facesheet? Yes   Assessment information obtained from? Patient;Caregiver   Communicated expected length of stay with patient/caregiver no   If Healthcare Directive is received, is it scanned into Epic? no (comment)   Prior to hospitilization cognitive status: Alert/Oriented;No Deficits   Prior to hospitalization functional status: Independent   Current cognitive status: Alert/Oriented;No Deficits   Current Functional Status: Independent   Arrived From home or self-care   Lives With spouse   Able to Return to Prior Arrangements yes   Is patient able to care for self after discharge? Yes   How many people do you have in your home that can help with your care after discharge? 1   Who are your caregiver(s) and their phone number(s)? (spouse, Esperanza Perez [phone])   Patient's perception of discharge disposition home or selfcare   Readmission Within The Last 30 Days no previous admission in last 30 days   Patient currently being followed by outpatient case management? No   Patient currently receives home health services? No   Does the patient currently use HME? No   Patient currently receives private duty nursing? No   Patient currently receives any other outside agency services? No   Equipment Currently Used at Home none   Do you have any problems affording any of your prescribed medications? No   Is the patient taking medications as prescribed? " yes   Do you have any financial concerns preventing you from receiving the healthcare you need? No   Does the patient have transportation to healthcare appointments? Yes   Transportation Available car;family or friend will provide   On Dialysis? No   Does the patient receive services at the Coumadin Clinic? No   Are there any open cases? No   Discharge Plan A Home with family   Discharge Plan B Home with family;Home Health   Patient/Family In Agreement With Plan yes   Help at home from spouse Esperanza and his cousin that lives here in the [facility]. Pt is from [facility] in [facility], he has been coming to [facility] and [facility] for 45 years for his  Health needs.  Stays at the [facility].  .  [facility] - [place], [place] - [address] [facility]  [address] [facility] [address]  Phone: [phone] Fax: [phone]    [facility] [address] [address]  Phone: [phone] Fax: [phone]

## 2017-08-21 NOTE — ASSESSMENT & PLAN NOTE
Possibly be true and STEMI, but I believe this is most likely cardiac strain from uncontrolled hypertension.    Recent Labs      08/20/17   2150  08/21/17   0351   TROPONINI  0.054*  0.261*

## 2017-08-21 NOTE — PROGRESS NOTES
TN informed OBS nurse that pt is ready for d/c from [facility]..Malissa Javier RN, BSN, [facility]  8/21/2017

## 2017-08-21 NOTE — PLAN OF CARE
Problem: Fall Risk (Adult)  Intervention: Reduce Risk/Promote Restraint Free Environment   17   Safety Interventions   Environmental Safety Modification clutter free environment maintained;lighting adjusted;room near unit station;room organization consistent   Prevent [place] Drop/Fall   Safety/Security Measures bed alarm set     Intervention: Patient Rounds   17   Safety Interventions   Patient Rounds bed in low position;bed wheels locked;call light in reach;clutter free environment maintained;ID band on;placement of personal items at bedside;toileting offered;visualized patient     Intervention: Safety Promotion/Fall Prevention   17   Safety Interventions   Safety Promotion/Fall Prevention Fall Risk reviewed with patient/family;nonskid shoes/socks when out of bed;side rails raised x 2       Goal: Absence of Falls  Patient will demonstrate the desired outcomes by discharge/transition of care.    17   Fall Risk (Adult)   Absence of Falls making progress toward outcome       Problem: Patient Care Overview  Goal: Plan of Care Review   17   Coping/Psychosocial   Plan Of Care Reviewed With patient       Problem: Hypertensive Disease/Crisis (Arterial) (Adult)  Intervention: Cluster Activity/Decrease Environmental Stimulation   17   Cognitive Interventions   Sensory Stimulation Regulation auditory stimulation minimized;lighting decreased;quiet environment promoted     Intervention: Support/Optimize Psychosocial Response to Illness   17   Coping/Psychosocial Interventions   Supportive Measures active listening utilized;positive reinforcement provided;relaxation techniques promoted;self-care encouraged;verbalization of feelings encouraged     Intervention: Gradually Decrease Blood Pressure   17   Safety Interventions   Medication Review/Management medications reviewed       Goal: Signs and Symptoms of Listed Potential Problems Will be  Absent, Minimized or Managed (Hypertensive Disease/Crisis)  Signs and symptoms of listed potential problems will be absent, minimized or managed by discharge/transition of care (reference Hypertensive Disease/Crisis (Arterial) (Adult) CPG).   08/21/17 0435   Hypertensive Disease/Crisis (Arterial)   Problems Assessed (Hypertensive Disease/Crisis (Arterial)) all   Problems Present (Hypertensive Disease/Crisis (Arterial)) severe hypertension/hypertensive crisis;situational response

## 2017-08-21 NOTE — ED PROVIDER NOTES
"Encounter Date: 8/20/2017    SCRIBE #1 NOTE: I, Moreno Pereira CHELSIE, am scribing for, and in the presence of,  Alexa Lawson MD. I have scribed the following portions of the note - Other sections scribed: HPI, ROS, PE.       History     Chief Complaint   Patient presents with    Hypertension     Checked bp today at home and blood pressure was up.  denies any symptoms, has hx stroke.      CC: HTN     HPI: This 77 y.o. male with HTN and prior CVA presents to the ED c/o mild substernal nonradiating CP and elevated BP at home0. Pt states he woke up this morning and checked blood pressure to find it higher than normal. He reports only drinking "a little more coffee than usual". The pt states he checked his pressure 3 more times before reaching 190/80. Pt states at 2pm he began to feel slight left sided chest pain. Pt reports taking 2 50mg Cozaar tablets today. Chest pain is exacerbated with respiration. Pt denies nausea, vomiting, and abdominal pain.     Patient lives in [facility].    Last year (summer 2016), the patient had a CVA which required TPA in [facility] and further transfer to [facility] for care.  He initially had significant weakness of his right side, but this has improved with physical therapy.  He still has some right lower extremity weakness.  He needed a PEG for 5 days, but it was removed after one month.    PMH: HTN, BPH, CVA (2016), UTI, cataract, arthritis  PSH: PEG (removed), tonsils & adeniods, appendectomy, epididymectomy, R shoulder surgery, cataract surgery      The history is provided by the patient.     Review of patient's allergies indicates:   Allergen Reactions    Bactrim [sulfamethoxazole-trimethoprim] Rash    Ambien [zolpidem] Rash     Past Medical History:   Diagnosis Date    Arthritis     BPH (benign prostatic hypertrophy) with urinary obstruction     Cataract     right eye    Essential hypertension 10/16/2012    Hypertension     PONV (postoperative nausea and vomiting)     Stroke     " Urinary tract infection      Past Surgical History:   Procedure Laterality Date    ADENOIDECTOMY      APPENDECTOMY      CATARACT EXTRACTION  6-    os    CATARACT EXTRACTION W/  INTRAOCULAR LENS IMPLANT Right 08/14/2017    Dr. Hooper    EPIDIDYMECTOMY      EYE SURGERY      SHOULDER ARTHROSCOPY      SHOULDER SURGERY      TONSILLECTOMY       Family History   Problem Relation Age of Onset    Cancer Mother     Cataracts Mother     Heart disease Father     Heart attack Father     Cancer Brother     Amblyopia Neg Hx     Blindness Neg Hx     Glaucoma Neg Hx     Macular degeneration Neg Hx     Retinal detachment Neg Hx     Strabismus Neg Hx     Melanoma Neg Hx      Social History   Substance Use Topics    Smoking status: Never Smoker    Smokeless tobacco: Never Used    Alcohol use Yes      Comment: socially     Review of Systems   Constitutional: Negative for chills, diaphoresis and fever.   HENT: Negative for ear pain and sore throat.    Eyes: Negative for visual disturbance.        (-) eye problems   Respiratory: Negative for cough and shortness of breath.    Cardiovascular: Positive for chest pain.   Gastrointestinal: Negative for abdominal pain, diarrhea, nausea and vomiting.   Genitourinary: Negative for dysuria and flank pain.   Musculoskeletal: Negative for back pain and neck stiffness.        (-) arm or leg pain   Skin: Negative for rash.   Neurological: Negative for headaches.       Physical Exam     Initial Vitals [08/20/17 2002]   BP Pulse Resp Temp SpO2   (!) 196/83 67 18 97.8 °F (36.6 °C) 97 %      MAP       120.67         Physical Exam    Nursing note and vitals reviewed.  Constitutional: He appears well-developed and well-nourished. He is not diaphoretic. No distress.   Nontoxic, awake and alert, appropriate.   HENT:   Head: Normocephalic and atraumatic.   Mouth/Throat: Oropharynx is clear and moist.   Eyes: Conjunctivae and EOM are normal. Pupils are equal, round, and reactive to  light.   Neck: Normal range of motion. Neck supple.   Cardiovascular: Normal rate, regular rhythm, normal heart sounds and intact distal pulses. Exam reveals no gallop and no friction rub.    No murmur heard.  Pulmonary/Chest: Breath sounds normal. No respiratory distress. He has no wheezes. He has no rhonchi. He has no rales. He exhibits no tenderness.   Abdominal: Soft. Bowel sounds are normal. He exhibits no distension. There is no tenderness. There is no rebound and no guarding.   Musculoskeletal: Normal range of motion. He exhibits edema (trace bilat LEs). He exhibits no tenderness.   Neurological: He is alert and oriented to person, place, and time. No cranial nerve deficit.   Minimal R sided weakness   Skin: Skin is warm and dry.         ED Course   Procedures  Labs Reviewed   COMPREHENSIVE METABOLIC PANEL - Abnormal; Notable for the following:        Result Value    Glucose 114 (*)     eGFR if non  57 (*)     All other components within normal limits   TROPONIN I - Abnormal; Notable for the following:     Troponin I 0.054 (*)     All other components within normal limits   CBC W/ AUTO DIFFERENTIAL   B-TYPE NATRIURETIC PEPTIDE     EKG Readings: (Independently Interpreted)   19:54: Normal sinus rhythm, heart rate 68.  Q waves in II, III.  No STEMI.       X-Rays:   Independently Interpreted Readings:   Other Readings:  CXR: NAD    Medical Decision Making:   History:   Old Medical Records: I decided to obtain old medical records.  Old Records Summarized: records from previous admission(s) and records from clinic visits.  Initial Assessment:   This is an emergent evaluation of 77-year-old male with left-sided chest pain and elevated blood pressure at home.  Reports compliance with home meds.  Differential Diagnosis:   Differential includes ACS, CHF, hypertensive urgency, other.  Independently Interpreted Test(s):   I have ordered and independently interpreted X-rays - see prior notes.  I have  ordered and independently interpreted EKG Reading(s) - see prior notes  Clinical Tests:   Lab Tests: Ordered and Reviewed  Radiological Study: Ordered and Reviewed  Medical Tests: Ordered and Reviewed  ED Management:  Patient was given oral aspirin.    EKG shows no STEMI.  Chest x-ray shows no acute process.  Lab work is significant for troponin 0.054, minimally elevated.  In the setting of normal renal function, this is concerning for hypertensive emergency.    Patient was given IV labetalol ×2 doses with significant improvement in BP.  Initial /83, . Final ED /70, .    Given mildly elevated troponin, patient requires observation stay to trend and to further control BP.  I discussed this with the patient and his family members and they are amenable.    Case and plan discussed with nocturnist Dr. Morrison.  Other:   I have discussed this case with another health care provider.            Scribe Attestation:   Scribe #1: I performed the above scribed service and the documentation accurately describes the services I performed. I attest to the accuracy of the note.    Attending Attestation:           Physician Attestation for Scribe:  Physician Attestation Statement for Scribe #1: I, Alexa Lawson MD, reviewed documentation, as scribed by Moreno Pereira II in my presence, and it is both accurate and complete.                 ED Course     Clinical Impression:   The primary encounter diagnosis was Hypertensive emergency. Diagnoses of Hypertension and Chest pain, unspecified type were also pertinent to this visit.                           Alexa Lawson MD  08/21/17 0413

## 2017-08-21 NOTE — PLAN OF CARE
08/21/17 1551   Final Note   Assessment Type Final Discharge Note   Discharge Disposition Home   Discharge planning education complete? Yes   What phone number can be called within the next 1-3 days to see how you are doing after discharge? (see chart)   Hospital Follow Up  Appt(s) scheduled? Yes   Discharge plans and expectations educations in teach back method with documentation complete? Yes   Offered [facility] -- Bedside Delivery? n/a   Discharge/Hospital Encounter Summary to (non-[facility]) PCP No   Referral to Outpatient Case Management complete? No   Referral to / orders for Home Health Complete? No   30 day supply of medicines given at discharge, if documented non-compliance / non-adherence? No   Any social issues identified prior to discharge? No   Did you assess the readiness or willingness of the family or caregiver to support self management of care? Yes   Right Care Referral Info   Post Acute Recommendation No Care   Malissa Javier, RN, BSN, STN [facility]  8/21/2017

## 2017-08-21 NOTE — ASSESSMENT & PLAN NOTE
Hypertensive emergency with elevated troponin level  · Presents with a systolic blood pressure of approximately 198/83 mmHg.    · In order to decrease the risk of acute stroke, we will initiate blood pressure medications with a goal of a decrease of 30% in the next 24 hours.  · Patient was given IV labetalol ×2 doses with significant improvement in BP.  Initial /83, . Final ED /70, .  · Thereafter, the goal while inpatient is a systolic blood pressure less than 160mmHg  · BP not in acceptable range at this time  · Initiate oral regimen as well as providing PRN IV medications  · May need to escalate to Cardene drip for tighter blood pressure control  · Continue current regimen

## 2017-08-21 NOTE — HOSPITAL COURSE
Patient presents with acute onset SOB and CP. On presentation patient blood pressures markedly elevated.  Patient was given IV labetalol ×2 doses with significant improvement in BP.  Initial /83, . Final ED /70, . Placed in observation on telemetry for further cardiac evaluation. Initial trop mildly elevated but less so than chronic baseline at .054. Second trop with bump to .261 likely secondary to demand from HTN urgency. Cardiology consulted with recommendation for stress and echo on today (8/21). Patient echo with 55 and no DD and stress revealing normal myocardial perfusion. Remained stable throughout stay and BP improved--will discharge with addition of amlodipine.

## 2017-08-21 NOTE — PROGRESS NOTES
Troponin increased from 0.054 to 0.261. Patient denies pain but reports 3/10 heaviness in his chest. He denies shortness of breathe. Patient placed on 2 liters of oxygen. B/P medication administered per orders. Awaiting call return from MD.

## 2017-08-21 NOTE — PROGRESS NOTES
AAOX3  Denies any pain, sob or discomfort.  VSS  No distress noted.  IV and tele dc'd.  DC and Rx instructions given verbally and written.  Pt verbalized understanding.  Waiting to family to dc pt home.

## 2017-08-21 NOTE — ED TRIAGE NOTES
Pt states he took his daily Cozaar this am for BP  But didn't feel well (left sided chest pain) around noon and noticed his BP was high so he took another 50 mg of Cozaar but his BP never went down. Pt still has a little bit of chest pain but denies blurred vision, numbness/tingling, SOB. Pt in no acute distress. Will continue to monitor.

## 2017-08-21 NOTE — ASSESSMENT & PLAN NOTE
· No evidence of acute stroke at this time  · Maintain adequate blood pressure control  · Heart healthy diet  · Aspirin  · Statin

## 2017-08-21 NOTE — PROGRESS NOTES
Received patient from ER to room via wheelchair. Patient accompanied by transport and family. Transferred patient to bed. Evaluated general patient appearance and condition. AAOx4. Breathing unlabored. No distress noted. Vitals stable. See flowsheet. Admit assessment initiated. Tele monitoring initiated. Saline lock intact. Skin is intact. Patient oriented to room, bed is low, alarm set and call light is within reach.

## 2017-08-21 NOTE — SUBJECTIVE & OBJECTIVE
Past Medical History:   Diagnosis Date    Arthritis     BPH (benign prostatic hypertrophy) with urinary obstruction     Cataract     right eye    Essential hypertension 10/16/2012    Hypertension     PONV (postoperative nausea and vomiting)     Stroke     Urinary tract infection        Past Surgical History:   Procedure Laterality Date    ADENOIDECTOMY      APPENDECTOMY      CATARACT EXTRACTION  6-    os    CATARACT EXTRACTION W/  INTRAOCULAR LENS IMPLANT Right 08/14/2017    Dr. Hooper    EPIDIDYMECTOMY      EYE SURGERY      SHOULDER ARTHROSCOPY      SHOULDER SURGERY      TONSILLECTOMY         Review of patient's allergies indicates:   Allergen Reactions    Bactrim [sulfamethoxazole-trimethoprim] Rash    Ambien [zolpidem] Rash       No current facility-administered medications on file prior to encounter.      Current Outpatient Prescriptions on File Prior to Encounter   Medication Sig    aspirin 325 MG tablet Take 1 tablet (325 mg total) by mouth once daily.    atorvastatin (LIPITOR) 40 MG tablet Take 1 tablet (40 mg total) by mouth once daily.    cholecalciferol, vitamin D3, 50,000 unit capsule Take by mouth once a week.    CYANOCOBALAMIN, VITAMIN B-12, (VITAMIN B-12 ORAL) Take 1 tablet by mouth once daily.    docusate sodium (COLACE) 100 MG capsule Take 1 capsule (100 mg total) by mouth 2 (two) times daily.    finasteride (PROSCAR) 5 mg tablet Take 1 tablet (5 mg total) by mouth once daily.    losartan (COZAAR) 50 MG tablet Take 1 tablet (50 mg total) by mouth once daily.    multivitamin capsule Take 1 capsule by mouth once daily.    tamsulosin (FLOMAX) 0.4 mg Cp24 Take 1 capsule (0.4 mg total) by mouth once daily.    ibuprofen (ADVIL,MOTRIN) 800 MG tablet Take 1 tablet (800 mg total) by mouth every 6 (six) hours as needed for Pain.     Family History     Problem Relation (Age of Onset)    Cancer Mother, Brother    Cataracts Mother    Heart attack Father    Heart disease Father         Social History Main Topics    Smoking status: Never Smoker    Smokeless tobacco: Never Used    Alcohol use No      Comment: socially    Drug use: No    Sexual activity: No     Review of Systems   Constitution: Negative.   HENT: Negative.    Eyes: Negative.    Cardiovascular: Positive for chest pain and dyspnea on exertion. Negative for irregular heartbeat, leg swelling, near-syncope, orthopnea, palpitations, paroxysmal nocturnal dyspnea and syncope.   Respiratory: Positive for shortness of breath.    Skin: Negative.    Musculoskeletal: Negative.    Gastrointestinal: Negative for abdominal pain, constipation and diarrhea.   Genitourinary: Negative for dysuria.   Neurological: Negative for dizziness.   Psychiatric/Behavioral: Negative.      Objective:     Vital Signs (Most Recent):  Temp: 98.2 °F (36.8 °C) (08/21/17 0715)  Pulse: (!) 57 (08/21/17 0715)  Resp: 16 (08/21/17 0715)  BP: 136/61 (08/21/17 0715)  SpO2: 98 % (08/21/17 0715) Vital Signs (24h Range):  Temp:  [97.8 °F (36.6 °C)-98.3 °F (36.8 °C)] 98.2 °F (36.8 °C)  Pulse:  [57-70] 57  Resp:  [16-18] 16  SpO2:  [96 %-100 %] 98 %  BP: (136-198)/(61-83) 136/61     Weight: 89.5 kg (197 lb 6.4 oz)  Body mass index is 29.15 kg/m².    SpO2: 98 %  O2 Device (Oxygen Therapy): room air      Intake/Output Summary (Last 24 hours) at 08/21/17 0844  Last data filed at 08/21/17 0400   Gross per 24 hour   Intake                0 ml   Output              400 ml   Net             -400 ml       Lines/Drains/Airways     Drain                 Gastrostomy/Enterostomy 06/08/16 1409 Percutaneous endoscopic gastrostomy (PEG) midline decompression;feeding 438 days          Airway                 Airway - Non-Surgical 08/14/17 1309 Nasal Cannula 6 days          Peripheral Intravenous Line                 Peripheral IV - Single Lumen 08/20/17 2150 Left Antecubital less than 1 day                Physical Exam   Constitutional: He is oriented to person, place, and time. He appears  well-developed and well-nourished. No distress.   HENT:   Head: Normocephalic and atraumatic.   Eyes: Conjunctivae and EOM are normal. Pupils are equal, round, and reactive to light.   Neck: Normal range of motion. Neck supple. No thyromegaly present.   Cardiovascular: Normal rate, regular rhythm and normal heart sounds.    No murmur heard.  Pulmonary/Chest: Effort normal and breath sounds normal. No respiratory distress. He has no wheezes. He has no rales. He exhibits no tenderness.   Abdominal: Soft. Bowel sounds are normal.   Musculoskeletal: He exhibits no edema.   Neurological: He is alert and oriented to person, place, and time.   Not evaluated fully   Skin: Skin is warm and dry.   Psychiatric: He has a normal mood and affect. His behavior is normal.       Significant Labs:   CMP   Recent Labs  Lab 08/20/17  2150      K 4.5      CO2 27   *   BUN 14   CREATININE 1.2   CALCIUM 9.2   PROT 6.5   ALBUMIN 3.6   BILITOT 0.4   ALKPHOS 108   AST 19   ALT 21   ANIONGAP 9   ESTGFRAFRICA >60   EGFRNONAA 57*   , CBC   Recent Labs  Lab 08/20/17 2150   WBC 8.34   HGB 14.7   HCT 43.0      , INR No results for input(s): INR, PROTIME in the last 48 hours., Lipid Panel No results for input(s): CHOL, HDL, LDLCALC, TRIG, CHOLHDL in the last 48 hours. and Troponin   Recent Labs  Lab 08/20/17 2150 08/21/17  0351   TROPONINI 0.054* 0.261*       Significant Imaging: Echocardiogram:   2D echo with color flow doppler:   Results for orders placed or performed during the hospital encounter of 06/04/16   Echo doppler color flow   Result Value Ref Range    EF 55 55 - 65    Mitral Valve Regurgitation TRIVIAL     Diastolic Dysfunction No     Est. PA Systolic Pressure 28     Mitral Valve Mobility NORMAL     Tricuspid Valve Regurgitation TRIVIAL

## 2017-08-21 NOTE — PROGRESS NOTES
WRITTEN DISCHARGE INFORMATION:     Follow-up Information     Clinton Fitzgerald MD On 8/25/2017.    Specialty:  Internal Medicine  Why:  out patient services:  2:00 pm follow up from the hospital  Contact information:  [address]  [facility] [address]  [phone]             Gilles Cade MD On 8/28/2017.    Specialties:  INTERVENTIONAL CARDIOLOGY, Cardiology  Why:  out patient services:  9:20 a.m., follow up from the hospital  Contact information:  [address]  [place] [address]  [phone]               Things that YOU are responsible for to Manage Your Care At Home:  1. Getting your prescriptions filled.  2. Taking you medications as directed. DO NOT MISS ANY DOSES!  3. Going to your follow-up doctor appointments. This is important because it allows the doctor to monitor your progress and to determine if any changes need to be made to your treatment plan.                              Help at Home  After discharge for assistance [facility] On Call Nurse Care Line 24/7 assistance  [phone]                   Thank you for choosing [facility] for your care.  Please answer any calls you may receive from [facility] we want to continue to support you as you manage your healthcare needs.  Sincerely, Your [facility] Healthcare Manager is,  Malissa Javier RN [facility] [phone]

## 2017-08-21 NOTE — ASSESSMENT & PLAN NOTE
Titrate medicines as needed  Permissive hypertension with previous stroke history  Check echocardiogram

## 2017-08-21 NOTE — HPI
"77 y.o. male with HTN and prior CVA presents to the ED c/o mild substernal nonradiating CP and elevated BP at home0. Pt states he woke up this morning and checked blood pressure to find it higher than normal. He reports only drinking "a little more coffee than usual". The pt states he checked his pressure 3 more times before reaching 190/80. Pt states at 2pm he began to feel slight left sided chest pain. Pt reports taking 2 50mg Cozaar tablets today. Chest pain is exacerbated with respiration. Pt denies nausea, vomiting, and abdominal pain. Patient lives in [facility].  Last year (summer 2016), the patient had a CVA which required TPA in [facility] and further transfer to [facility] for care.  He initially had significant weakness of his right side, but this has improved with physical therapy.  He still has some right lower extremity weakness.  He needed a PEG for 5 days, but it was removed after one month.    He follows with Dr. Fitzgerald at the main campus.  He has undergone any recent cardiovascular workup.  He still has some residual substernal chest pressure.  Feels a little bit better in terms of his breathing after the blood pressure was controlled.  He denies any current PND, orthopnea or lower edema.  He's not expressing dizziness, presyncope or syncope.  "

## 2017-08-21 NOTE — H&P
"[facility] Hospital Medicine  History & Physical    Patient Name: Ar Perez  MRN: [id number]  Admission Date: 08/21/2017  Attending Physician: Cong Morrison MD, MPH      PCP:     Clinton Fitzgerald MD    CC:     Chief Complaint   Patient presents with    Hypertension     Checked bp today at home and blood pressure was up.  denies any symptoms, has hx stroke.        HISTORY OF PRESENT ILLNESS:     Ar Perez is a 77 y.o. male that (in part)  has a past medical history of Arthritis; BPH (benign prostatic hypertrophy) with urinary obstruction; Cataract; Essential hypertension; Hypertension; PONV (postoperative nausea and vomiting); Stroke; and Urinary tract infection. Presents to [facility] Emergency Department complaining of mild substernal nonradiating chest pain associated with elevated blood pressure.  The patient reports he woke up this morning and checked his blood pressure which was much higher than normal. He reports only drinking "a little more coffee than usual".  He subsequently checked his pressure three more times before reaching systolic in the 190s were measured.  At around 2 PM he began to feel subacute onset of pain left sided chest pain.  He took 100 mg of Cozaar today prior to arrival. Chest pain is exacerbated with respiration and exertion. Pt denies nausea, vomiting, and abdominal pain.      Patient lives in [facility].  Last year (summer 2016), the patient had an acute left-sided CVA which required TPA in [facility] and further transfer to [facility] for care.  He initially had significant weakness of his right side, but this has improved with physical therapy.  He still has some right lower extremity weakness.  He needed a PEG for 5 days, but it was removed after one month.    In the emergency department routine laboratory studies, EKG, cardiac enzymes were obtained.  His blood pressures markedly elevated.  Patient was given IV " labetalol ×2 doses with significant improvement in BP.  Initial /83, . Final ED /70, .  Given his mildly elevated troponin, the patient requires observation stay to trend and to further control BP.  This was discussed with the patient and his family members and they are agreeable.      Hospital medicine has been asked to admit for further evaluation and treatment.       REVIEW OF SYSTEMS:     -- Constitutional: No fever or chills.  -- Eyes: No visual changes, diplopia, pain, tearing, blind spots, or discharge.   -- Ears, nose, mouth, throat, and face: No congestion, sore throat, epistaxis, d/c, bleeding gums, neck stiffness masses, or dental issues.  -- Respiratory: Dyspnea with exertion.  No cough, hemoptysis, stridor, wheezing, or night sweats.  -- Cardiovascular: As noted above in history of present illness.   -- Gastrointestinal: No vomiting, abdominal pain, hematemesis, melena, dyspepsia, or change in bowel habits.  -- Genitourinary: No hematuria, dysuria, frequency, urgency, nocturia, polyuria, stones, or incontinence.  -- Integument/breast: No rash, pruritis, pigmentation changes, dryness, or changes in hair  -- Hematologic/lymphatic: No easy bruising or lymphadenopathy.   -- Musculoskeletal: No acute arthralgias, acute myalgias, joint swelling, acute limitations of ROM, or acute muscular weakness.  -- Neurological: Mild right-sided paresis from old stroke. No seizures, headaches, incoordination, paraesthesias, ataxia, vertigo, or tremors.  -- Behavioral/Psych: No auditory or visual hallucinations, depression, or suicidal/homicidal ideations.  -- Endocrine: No heat or cold intolerance, polydipsia, or unintentional weight gain / loss.  -- Allergy/Immunologic: No recurrent infections or adverse reaction to food, insects, or difficulty breathing.    Pain Scale  0 on scale of 1 to 10, currently     PAST MEDICAL / SURGICAL HISTORY:     Past Medical History:   Diagnosis Date    Arthritis      BPH (benign prostatic hypertrophy) with urinary obstruction     Cataract     right eye    Essential hypertension 10/16/2012    Hypertension     PONV (postoperative nausea and vomiting)     Stroke     Urinary tract infection      Past Surgical History:   Procedure Laterality Date    ADENOIDECTOMY      APPENDECTOMY      CATARACT EXTRACTION  6-    os    CATARACT EXTRACTION W/  INTRAOCULAR LENS IMPLANT Right 08/14/2017    Dr. Hooper    EPIDIDYMECTOMY      EYE SURGERY      SHOULDER ARTHROSCOPY      SHOULDER SURGERY      TONSILLECTOMY           FAMILY HISTORY:     Family History   Problem Relation Age of Onset    Cancer Mother     Cataracts Mother     Heart disease Father     Heart attack Father     Cancer Brother     Amblyopia Neg Hx     Blindness Neg Hx     Glaucoma Neg Hx     Macular degeneration Neg Hx     Retinal detachment Neg Hx     Strabismus Neg Hx     Melanoma Neg Hx          SOCIAL HISTORY:     Social History   Substance Use Topics    Smoking status: Never Smoker    Smokeless tobacco: Never Used    Alcohol use No      Comment: socially     Social History     Social History    Marital status:      Spouse name: N/A    Number of children: N/A    Years of education: N/A     Social History Main Topics    Smoking status: Never Smoker    Smokeless tobacco: Never Used    Alcohol use No      Comment: socially    Drug use: No    Sexual activity: No     Other Topics Concern    None     Social History Narrative    None         ALLERGIES:       Review of patient's allergies indicates:   Allergen Reactions    Bactrim [sulfamethoxazole-trimethoprim] Rash    Ambien [zolpidem] Rash         HEALTH SCREENING:     Prevnar 13 pneumonia vaccine =  evidence of previous vaccination found in the medical record      HOME MEDICATIONS:     Prior to Admission medications    Medication Sig Start Date End Date Taking? Authorizing Provider   aspirin 325 MG tablet Take 1 tablet (325 mg  total) by mouth once daily. 6/23/16 8/20/17 Yes Valerie Bailey MD   atorvastatin (LIPITOR) 40 MG tablet Take 1 tablet (40 mg total) by mouth once daily. 8/18/17 8/18/18 Yes Clinton Fitzgerald MD   cholecalciferol, vitamin D3, 50,000 unit capsule Take by mouth once a week. 8/7/13  Yes Clinton Fitzgerald MD   CYANOCOBALAMIN, VITAMIN B-12, (VITAMIN B-12 ORAL) Take 1 tablet by mouth once daily.   Yes Historical Provider, MD   docusate sodium (COLACE) 100 MG capsule Take 1 capsule (100 mg total) by mouth 2 (two) times daily. 6/23/16  Yes Valerie Bailey MD   finasteride (PROSCAR) 5 mg tablet Take 1 tablet (5 mg total) by mouth once daily. 11/10/14  Yes Clinton Fitzgerald MD   fluticasone (FLONASE) 50 mcg/actuation nasal spray 1 spray by Each Nare route every evening.   Yes Historical Provider, MD   losartan (COZAAR) 50 MG tablet Take 1 tablet (50 mg total) by mouth once daily. 7/28/14  Yes Clinton Fitzgerald MD   multivitamin capsule Take 1 capsule by mouth once daily.   Yes Historical Provider, MD   tamsulosin (FLOMAX) 0.4 mg Cp24 Take 1 capsule (0.4 mg total) by mouth once daily. 11/10/14  Yes Clinton Fitzgerald MD   trazodone (DESYREL) 50 MG tablet Take 50 mg by mouth every evening.   Yes Historical Provider, MD   ibuprofen (ADVIL,MOTRIN) 800 MG tablet Take 1 tablet (800 mg total) by mouth every 6 (six) hours as needed for Pain. 4/20/17   Jake Dacosta MD          HOSPITAL MEDICATIONS:     Scheduled Meds:    amlodipine  10 mg Oral Daily    aspirin  325 mg Oral Daily    atorvastatin  40 mg Oral Daily    enoxaparin  40 mg Subcutaneous Daily    finasteride  5 mg Oral Daily    losartan  50 mg Oral Daily    tamsulosin  0.4 mg Oral Daily    trazodone  50 mg Oral QHS     Continuous Infusions:    PRN Meds: acetaminophen, acetaminophen, hydrALAZINE, labetalol, morphine, ondansetron, promethazine (PHENERGAN) IVPB      PHYSICAL EXAM:     Wt Readings from Last 1 Encounters:   08/21/17 0400 89.5 kg (197 lb  "6.4 oz)   08/21/17 0132 88.3 kg (194 lb 9.6 oz)   08/20/17 2002 88.5 kg (195 lb)     Body mass index is 29.15 kg/m².  Vitals:    08/21/17 0048 08/21/17 0132 08/21/17 0205 08/21/17 0400   BP: (!) 165/74 (!) 175/73 (!) 162/70 (!) 176/74   Pulse: 66 64 67 60   Resp:  18  16   Temp:  97.9 °F (36.6 °C)  98.3 °F (36.8 °C)   TempSrc:  Oral     SpO2: 97% 96%  100%   Weight:  88.3 kg (194 lb 9.6 oz)  89.5 kg (197 lb 6.4 oz)   Height:  5' 9" (1.753 m)            -- General appearance: well developed. appears stated age   -- Head: normocephalic, atraumatic   -- Eyes: conjunctivae clear. Extraocular muscles intact  -- Nose: Nares normal. Septum midline.   -- Mouth/Throat: lips, mucosa, and tongue normal. no throat erythema.   -- Neck: supple, symmetrical, trachea midline, no JVD and thyroid not grossly enlarged, appears symmetric  -- Lungs: clear to auscultation bilaterally. normal respiratory effort. No use of accessory muscles.   -- Chest wall: no tenderness. equal bilateral chest rise   -- Heart: regular rate and rhythm. S1, S2 normal.  no click, rub or gallop   -- Abdomen: soft, non-tender, non-distended, non-tympanic; bowel sounds normal; no masses  -- Extremities: no cyanosis, clubbing or edema.   -- Pulses: 2+ and symmetric   -- Skin: color normal, texture normal, turgor normal. No rashes or lesions.   -- Neurologic:  Mild right-sided deficit. Normal strength and tone. CNII-XII intact. Tim coma scale: eyes open spontaneously-4, oriented & converses-5, obeys commands-6.      LABORATORY STUDIES:     Recent Results (from the past 36 hour(s))   CBC auto differential    Collection Time: 08/20/17  9:50 PM   Result Value Ref Range    WBC 8.34 3.90 - 12.70 K/uL    RBC 4.86 4.60 - 6.20 M/uL    Hemoglobin 14.7 14.0 - 18.0 g/dL    Hematocrit 43.0 40.0 - 54.0 %    MCV 89 82 - 98 fL    MCH 30.2 27.0 - 31.0 pg    MCHC 34.2 32.0 - 36.0 g/dL    RDW 13.4 11.5 - 14.5 %    Platelets 199 150 - 350 K/uL    MPV 10.8 9.2 - 12.9 fL    Gran " # 4.9 1.8 - 7.7 K/uL    Lymph # 2.2 1.0 - 4.8 K/uL    Mono # 0.9 0.3 - 1.0 K/uL    Eos # 0.2 0.0 - 0.5 K/uL    Baso # 0.09 0.00 - 0.20 K/uL    Gran% 58.3 38.0 - 73.0 %    Lymph% 26.9 18.0 - 48.0 %    Mono% 10.2 4.0 - 15.0 %    Eosinophil% 2.9 0.0 - 8.0 %    Basophil% 1.1 0.0 - 1.9 %    Differential Method Automated    Comprehensive metabolic panel    Collection Time: 08/20/17  9:50 PM   Result Value Ref Range    Sodium 142 136 - 145 mmol/L    Potassium 4.5 3.5 - 5.1 mmol/L    Chloride 106 95 - 110 mmol/L    CO2 27 23 - 29 mmol/L    Glucose 114 (H) 70 - 110 mg/dL    BUN, Bld 14 8 - 23 mg/dL    Creatinine 1.2 0.5 - 1.4 mg/dL    Calcium 9.2 8.7 - 10.5 mg/dL    Total Protein 6.5 6.0 - 8.4 g/dL    Albumin 3.6 3.5 - 5.2 g/dL    Total Bilirubin 0.4 0.1 - 1.0 mg/dL    Alkaline Phosphatase 108 55 - 135 U/L    AST 19 10 - 40 U/L    ALT 21 10 - 44 U/L    Anion Gap 9 8 - 16 mmol/L    eGFR if African American >60 >60 mL/min/1.73 m^2    eGFR if non African American 57 (A) >60 mL/min/1.73 m^2   Brain natriuretic peptide    Collection Time: 08/20/17  9:50 PM   Result Value Ref Range    BNP 43 0 - 99 pg/mL   Troponin I    Collection Time: 08/20/17  9:50 PM   Result Value Ref Range    Troponin I 0.054 (H) 0.000 - 0.026 ng/mL   Troponin I    Collection Time: 08/21/17  3:51 AM   Result Value Ref Range    Troponin I 0.261 (H) 0.000 - 0.026 ng/mL       Lab Results   Component Value Date    INR 1.0 06/04/2016    INR 1.0 07/25/2014    INR 1.0 11/20/2009     Lab Results   Component Value Date    HGBA1C 6.1 04/21/2017         IMAGING:     Imaging Results          X-Ray Chest PA And Lateral (Final result)  Result time 08/20/17 21:16:32    Final result by Yohan Bales MD (08/20/17 21:16:32)                 Impression:      No acute cardiopulmonary process identified.      Electronically signed by: YOHAN BALES MD  Date:     08/20/17  Time:    21:16              Narrative:    Chest PA and lateral.  Comparison: 6/2016.    Mediastinal  structures are midline.  Cardiac silhouette is normal in stable in size.  Lungs are symmetrically expanded.  No evidence of focal consolidative process, pneumothorax, or significant effusion.  Bones show DJD.  No free air visualized beneath the diaphragm.                                CONSULTS:     IP CONSULT TO CARDIOLOGY       ASSESSMENT & PLAN:     Primary Diagnosis:  Hypertensive emergency    Active Hospital Problems    Diagnosis  POA    *Hypertensive emergency [I16.1]  Yes     Priority: 1 - High    Elevated troponin [R74.8]  Yes     Priority: 2      Recent Labs      08/20/17 2150 08/21/17   0351   TROPONINI  0.054*  0.261*           History of stroke with residual deficit [I69.30]  Not Applicable     Left-sided CVA.      Essential hypertension [I10]  Yes    Benign prostatic hyperplasia [N40.0]  Yes      Resolved Hospital Problems    Diagnosis Date Resolved POA   No resolved problems to display.         Hypertensive emergency  Hypertensive emergency with elevated troponin level  · Presents with a systolic blood pressure of approximately 198/83 mmHg.    · In order to decrease the risk of acute stroke, we will initiate blood pressure medications with a goal of a decrease of 30% in the next 24 hours.  · Patient was given IV labetalol ×2 doses with significant improvement in BP.  Initial /83, . Final ED /70, .  · Thereafter, the goal while inpatient is a systolic blood pressure less than 160mmHg  · BP not in acceptable range at this time  · Initiate oral regimen as well as providing PRN IV medications  · May need to escalate to Cardene drip for tighter blood pressure control  · Continue current regimen      Elevated troponin  Possibly be true and STEMI, but I believe this is most likely cardiac strain from uncontrolled hypertension.    Recent Labs      08/20/17 2150 08/21/17   0351   TROPONINI  0.054*  0.261*           Essential hypertension  Hypertension management as noted  above      Benign prostatic hyperplasia  · Without acute issues  · Continue home medications      History of stroke with residual deficit  · No evidence of acute stroke at this time  · Maintain adequate blood pressure control  · Heart healthy diet  · Aspirin  · Statin            VTE Risk Mitigation         Ordered     enoxaparin injection 40 mg  Daily     Route:  Subcutaneous        08/21/17 0117     Medium Risk of VTE  Once      08/21/17 0117     Place MARIYA hose  Until discontinued      08/21/17 0117     Place sequential compression device  Until discontinued      08/21/17 0117            Adult PRN medications available   DVT prophylaxis given       DISPOSITION:     Will admit to the Hospital Medicine service for further evaluation and treatment.    Chart reviewed and updated where applicable.    High Risk Conditions:  Patient has a condition that poses threat to life and bodily function: Hypertensive emergency       ===============================================================    Cong Morrison MD, MPH  Department of Hospital Medicine   [facility]  [phone] pg  (7pm - 6am)          This note is dictated using Dragon Medical 360 voice recognition software.  There are word recognition mistakes that are occasionally missed on review.

## 2017-08-21 NOTE — CONSULTS
"  [facility]  Cardiology  Consult Note    Patient Name: Ar Perez  MRN: [id number]  Admission Date: 8/20/2017  Hospital Length of Stay: 0 days  Code Status: Full Code   Attending Provider: Sarina Stokes MD   Consulting Provider: Gilles Chopra MD  Primary Care Physician: Clinton Fitzgerald MD  Principal Problem:Hypertensive emergency    Patient information was obtained from patient, past medical records and ER records.     Inpatient consult to Cardiology  Consult performed by: GILLES CHOPRA  Consult ordered by: NAYE MCDANIEL  Reason for consult: Hypertensive emergency         Subjective:     Chief Complaint:  Pain shortness of breath     HPI:   77 y.o. male with HTN and prior CVA presents to the ED c/o mild substernal nonradiating CP and elevated BP at home0. Pt states he woke up this morning and checked blood pressure to find it higher than normal. He reports only drinking "a little more coffee than usual". The pt states he checked his pressure 3 more times before reaching 190/80. Pt states at 2pm he began to feel slight left sided chest pain. Pt reports taking 2 50mg Cozaar tablets today. Chest pain is exacerbated with respiration. Pt denies nausea, vomiting, and abdominal pain. Patient lives in [facility].  Last year (summer 2016), the patient had a CVA which required TPA in [facility] and further transfer to [facility] for care.  He initially had significant weakness of his right side, but this has improved with physical therapy.  He still has some right lower extremity weakness.  He needed a PEG for 5 days, but it was removed after one month.    He follows with Dr. Fitzgerald at the [facility].  He has undergone any recent cardiovascular workup.  He still has some residual substernal chest pressure.  Feels a little bit better in terms of his breathing after the blood pressure was controlled.  He denies any current PND, orthopnea or lower edema.  He's not expressing dizziness, " presyncope or syncope.    Past Medical History:   Diagnosis Date    Arthritis     BPH (benign prostatic hypertrophy) with urinary obstruction     Cataract     right eye    Essential hypertension 10/16/2012    Hypertension     PONV (postoperative nausea and vomiting)     Stroke     Urinary tract infection        Past Surgical History:   Procedure Laterality Date    ADENOIDECTOMY      APPENDECTOMY      CATARACT EXTRACTION  6-    os    CATARACT EXTRACTION W/  INTRAOCULAR LENS IMPLANT Right 08/14/2017    Dr. Hooper    EPIDIDYMECTOMY      EYE SURGERY      SHOULDER ARTHROSCOPY      SHOULDER SURGERY      TONSILLECTOMY         Review of patient's allergies indicates:   Allergen Reactions    Bactrim [sulfamethoxazole-trimethoprim] Rash    Ambien [zolpidem] Rash       No current facility-administered medications on file prior to encounter.      Current Outpatient Prescriptions on File Prior to Encounter   Medication Sig    aspirin 325 MG tablet Take 1 tablet (325 mg total) by mouth once daily.    atorvastatin (LIPITOR) 40 MG tablet Take 1 tablet (40 mg total) by mouth once daily.    cholecalciferol, vitamin D3, 50,000 unit capsule Take by mouth once a week.    CYANOCOBALAMIN, VITAMIN B-12, (VITAMIN B-12 ORAL) Take 1 tablet by mouth once daily.    docusate sodium (COLACE) 100 MG capsule Take 1 capsule (100 mg total) by mouth 2 (two) times daily.    finasteride (PROSCAR) 5 mg tablet Take 1 tablet (5 mg total) by mouth once daily.    losartan (COZAAR) 50 MG tablet Take 1 tablet (50 mg total) by mouth once daily.    multivitamin capsule Take 1 capsule by mouth once daily.    tamsulosin (FLOMAX) 0.4 mg Cp24 Take 1 capsule (0.4 mg total) by mouth once daily.    ibuprofen (ADVIL,MOTRIN) 800 MG tablet Take 1 tablet (800 mg total) by mouth every 6 (six) hours as needed for Pain.     Family History     Problem Relation (Age of Onset)    Cancer Mother, Brother    Cataracts Mother    Heart attack Father     Heart disease Father        Social History Main Topics    Smoking status: Never Smoker    Smokeless tobacco: Never Used    Alcohol use No      Comment: socially    Drug use: No    Sexual activity: No     Review of Systems   Constitution: Negative.   HENT: Negative.    Eyes: Negative.    Cardiovascular: Positive for chest pain and dyspnea on exertion. Negative for irregular heartbeat, leg swelling, near-syncope, orthopnea, palpitations, paroxysmal nocturnal dyspnea and syncope.   Respiratory: Positive for shortness of breath.    Skin: Negative.    Musculoskeletal: Negative.    Gastrointestinal: Negative for abdominal pain, constipation and diarrhea.   Genitourinary: Negative for dysuria.   Neurological: Negative for dizziness.   Psychiatric/Behavioral: Negative.      Objective:     Vital Signs (Most Recent):  Temp: 98.2 °F (36.8 °C) (08/21/17 0715)  Pulse: (!) 57 (08/21/17 0715)  Resp: 16 (08/21/17 0715)  BP: 136/61 (08/21/17 0715)  SpO2: 98 % (08/21/17 0715) Vital Signs (24h Range):  Temp:  [97.8 °F (36.6 °C)-98.3 °F (36.8 °C)] 98.2 °F (36.8 °C)  Pulse:  [57-70] 57  Resp:  [16-18] 16  SpO2:  [96 %-100 %] 98 %  BP: (136-198)/(61-83) 136/61     Weight: 89.5 kg (197 lb 6.4 oz)  Body mass index is 29.15 kg/m².    SpO2: 98 %  O2 Device (Oxygen Therapy): room air      Intake/Output Summary (Last 24 hours) at 08/21/17 0844  Last data filed at 08/21/17 0400   Gross per 24 hour   Intake                0 ml   Output              400 ml   Net             -400 ml       Lines/Drains/Airways     Drain                 Gastrostomy/Enterostomy 06/08/16 1409 Percutaneous endoscopic gastrostomy (PEG) midline decompression;feeding 438 days          Airway                 Airway - Non-Surgical 08/14/17 1309 Nasal Cannula 6 days          Peripheral Intravenous Line                 Peripheral IV - Single Lumen 08/20/17 2150 Left Antecubital less than 1 day                Physical Exam   Constitutional: He is oriented to person,  place, and time. He appears well-developed and well-nourished. No distress.   HENT:   Head: Normocephalic and atraumatic.   Eyes: Conjunctivae and EOM are normal. Pupils are equal, round, and reactive to light.   Neck: Normal range of motion. Neck supple. No thyromegaly present.   Cardiovascular: Normal rate, regular rhythm and normal heart sounds.    No murmur heard.  Pulmonary/Chest: Effort normal and breath sounds normal. No respiratory distress. He has no wheezes. He has no rales. He exhibits no tenderness.   Abdominal: Soft. Bowel sounds are normal.   Musculoskeletal: He exhibits no edema.   Neurological: He is alert and oriented to person, place, and time.   Not evaluated fully   Skin: Skin is warm and dry.   Psychiatric: He has a normal mood and affect. His behavior is normal.       Significant Labs:   CMP   Recent Labs  Lab 08/20/17  2150      K 4.5      CO2 27   *   BUN 14   CREATININE 1.2   CALCIUM 9.2   PROT 6.5   ALBUMIN 3.6   BILITOT 0.4   ALKPHOS 108   AST 19   ALT 21   ANIONGAP 9   ESTGFRAFRICA >60   EGFRNONAA 57*   , CBC   Recent Labs  Lab 08/20/17  2150   WBC 8.34   HGB 14.7   HCT 43.0      , INR No results for input(s): INR, PROTIME in the last 48 hours., Lipid Panel No results for input(s): CHOL, HDL, LDLCALC, TRIG, CHOLHDL in the last 48 hours. and Troponin   Recent Labs  Lab 08/20/17  2150 08/21/17  0351   TROPONINI 0.054* 0.261*       Significant Imaging: Echocardiogram:   2D echo with color flow doppler:   Results for orders placed or performed during the hospital encounter of 06/04/16   Echo doppler color flow   Result Value Ref Range    EF 55 55 - 65    Mitral Valve Regurgitation TRIVIAL     Diastolic Dysfunction No     Est. PA Systolic Pressure 28     Mitral Valve Mobility NORMAL     Tricuspid Valve Regurgitation TRIVIAL      Assessment and Plan:     * Hypertensive emergency    Titrate medicines as needed  Permissive hypertension with previous stroke history  Check  echocardiogram        Elevated troponin    Likely secondary to demand  Plan for ischemic workup today        History of stroke with residual deficit    History of TPA        Mixed hyperlipidemia    On statin            VTE Risk Mitigation         Ordered     enoxaparin injection 40 mg  Daily     Route:  Subcutaneous        08/21/17 0117     Medium Risk of VTE  Once      08/21/17 0117     Place MARIYA hose  Until discontinued      08/21/17 0117     Place sequential compression device  Until discontinued      08/21/17 0117          Thank you for your consult. I will follow-up with patient. Please contact us if you have any additional questions.    Gilles Cade MD  Cardiology   [facility]

## 2017-08-22 VITALS — DIASTOLIC BLOOD PRESSURE: 72 MMHG | SYSTOLIC BLOOD PRESSURE: 126 MMHG | HEART RATE: 62 BPM

## 2017-08-22 PROBLEM — E78.2 MIXED HYPERLIPIDEMIA: Status: ACTIVE | Noted: 2017-08-22

## 2017-08-22 NOTE — DISCHARGE SUMMARY
"[facility] Hospital Medicine  Discharge Summary      Patient Name: Ar Perez  MRN: [id number]  Admission Date: 8/20/2017  Hospital Length of Stay: 0 days  Discharge Date and Time: 8/21/2017  5:24 PM  Attending Physician: Miranda att. providers found   Discharging Provider: Rachele Dill PA-C  Primary Care Provider: Clinton Fitzgerald MD      HPI:   Ar Perez is a 77 y.o. male that (in part)  has a past medical history of Arthritis; BPH (benign prostatic hypertrophy) with urinary obstruction; Cataract; Essential hypertension; Hypertension; PONV (postoperative nausea and vomiting); Stroke; and Urinary tract infection. Presents to [facility] Emergency Department complaining of mild substernal nonradiating chest pain associated with elevated blood pressure.  The patient reports he woke up this morning and checked his blood pressure which was much higher than normal. He reports only drinking "a little more coffee than usual".  He subsequently checked his pressure three more times before reaching systolic in the 190s were measured.  At around 2 PM he began to feel subacute onset of pain left sided chest pain.  He took 100 mg of Cozaar today prior to arrival. Chest pain is exacerbated with respiration and exertion. Pt denies nausea, vomiting, and abdominal pain.      Patient lives in [facility].  Last year (summer 2016), the patient had an acute left-sided CVA which required TPA in [facility] and further transfer to [facility] for care.  He initially had significant weakness of his right side, but this has improved with physical therapy.  He still has some right lower extremity weakness.  He needed a PEG for 5 days, but it was removed after one month.    In the emergency department routine laboratory studies, EKG, cardiac enzymes were obtained.  His blood pressures markedly elevated.  Patient was given IV labetalol ×2 doses with significant improvement in BP.  " Initial /83, . Final ED /70, .  Given his mildly elevated troponin, the patient requires observation stay to trend and to further control BP.  This was discussed with the patient and his family members and they are agreeable.      Hospital medicine has been asked to admit for further evaluation and treatment.       * No surgery found *      Indwelling Lines/Drains at time of discharge:   Lines/Drains/Airways          No matching active lines, drains, or airways        Hospital Course:   Patient presents with acute onset SOB and CP. On presentation patient blood pressures markedly elevated.  Patient was given IV labetalol ×2 doses with significant improvement in BP.  Initial /83, . Final ED /70, . Placed in observation on telemetry for further cardiac evaluation. Initial trop mildly elevated but less so than chronic baseline at .054. Second trop with bump to .261 likely secondary to demand from HTN urgency. Cardiology consulted with recommendation for stress and echo on today (8/21). Patient echo with 55 and no DD and stress revealing normal myocardial perfusion. Remained stable throughout stay and BP improved--will discharge with addition of amlodipine.       Consults:   Consults         Status Ordering Provider     Inpatient consult to Cardiology  Once     Provider:  Gilles Cade MD    Completed NAYE MCDANIEL          Significant Diagnostic Studies: Labs:   CMP   Recent Labs  Lab 08/20/17  2150      K 4.5      CO2 27   *   BUN 14   CREATININE 1.2   CALCIUM 9.2   PROT 6.5   ALBUMIN 3.6   BILITOT 0.4   ALKPHOS 108   AST 19   ALT 21   ANIONGAP 9   ESTGFRAFRICA >60   EGFRNONAA 57*   , CBC   Recent Labs  Lab 08/20/17  2150   WBC 8.34   HGB 14.7   HCT 43.0       and Troponin   Recent Labs  Lab 08/21/17  1022   TROPONINI 0.381*     Stress: Impression: NORMAL MYOCARDIAL PERFUSION  1. The perfusion scan is free of evidence for  myocardial ischemia or injury.   2. Resting wall motion is physiologic.   3. Resting LV function is normal.   4. The ventricular volumes are normal at rest and stress.   5. The extracardiac distribution of radioactivity is normal.     Cardiac Graphics: Echocardiogram:   2D echo with color flow doppler:   Results for orders placed or performed during the hospital encounter of 08/20/17   2D echo with color flow doppler   Result Value Ref Range    EF 50 55 - 65    Mitral Valve Regurgitation TRIVIAL     Diastolic Dysfunction No     Est. PA Systolic Pressure 22.01     Pericardial Effusion NONE     Tricuspid Valve Regurgitation TRIVIAL TO MILD        Pending Diagnostic Studies:     Procedure Component Value Units Date/Time    NM Myocardial Perfusion Spect Multi Pharmacologic [[id number]] Resulted:  08/21/17 1017    Order Status:  Sent Lab Status:  In process Updated:  08/21/17 1327        Final Active Diagnoses:    Diagnosis Date Noted POA    Mixed hyperlipidemia [E78.2] 08/22/2017 Yes    Elevated troponin [R74.8] 08/21/2017 Yes    History of stroke with residual deficit [I69.30] 08/21/2017 Not Applicable    Essential hypertension [I10] 10/16/2012 Yes    Benign prostatic hyperplasia [N40.0] 10/16/2012 Yes      Problems Resolved During this Admission:    Diagnosis Date Noted Date Resolved POA    PRINCIPAL PROBLEM:  Hypertensive emergency [I16.1] 08/20/2017 08/21/2017 Yes      No new Assessment & Plan notes have been filed under this hospital service since the last note was generated.  Service: Hospital Medicine      Discharged Condition: good    Disposition: Home or Self Care    Follow Up:  Follow-up Information     Clinton Fitzgerald MD On 8/25/2017.    Specialty:  Internal Medicine  Why:  out patient services:  2:00 pm follow up from the hospital  Contact information:  [address] [facility] [address]  [phone]             Gilles Cade MD On 8/28/2017.    Specialties:  INTERVENTIONAL CARDIOLOGY,  Cardiology  Why:  out patient services:  9:20 a.m., follow up from the hospital  Contact information:  [address] [address]  [place] [address]  [phone]                 Patient Instructions:     Diet general   Order Specific Question Answer Comments   Additional restrictions: Cardiac (Low Na/Chol)      Activity as tolerated       Medications:  Reconciled Home Medications:   Discharge Medication List as of 8/21/2017  4:30 PM      START taking these medications    Details   amlodipine (NORVASC) 10 MG tablet Take 1 tablet (10 mg total) by mouth once daily., Starting Mon 8/21/2017, Until Tue 8/21/2018, Normal         CONTINUE these medications which have NOT CHANGED    Details   aspirin 325 MG tablet Take 1 tablet (325 mg total) by mouth once daily., Starting Thu 6/23/2016, Until Sun 8/20/2017, OTC      atorvastatin (LIPITOR) 40 MG tablet Take 1 tablet (40 mg total) by mouth once daily., Starting Fri 8/18/2017, Until Sat 8/18/2018, Print      cholecalciferol, vitamin D3, 50,000 unit capsule Take by mouth once a week., Starting 8/7/2013, Until Discontinued, Print      CYANOCOBALAMIN, VITAMIN B-12, (VITAMIN B-12 ORAL) Take 1 tablet by mouth once daily., Until Discontinued, Historical Med      docusate sodium (COLACE) 100 MG capsule Take 1 capsule (100 mg total) by mouth 2 (two) times daily., Starting 6/23/2016, Until Discontinued, OTC      finasteride (PROSCAR) 5 mg tablet Take 1 tablet (5 mg total) by mouth once daily., Starting 11/10/2014, Until Discontinued, Normal      fluticasone (FLONASE) 50 mcg/actuation nasal spray 1 spray by Each Nare route every evening., Historical Med      losartan (COZAAR) 50 MG tablet Take 1 tablet (50 mg total) by mouth once daily., Starting 7/28/2014, Until Discontinued, Normal      multivitamin capsule Take 1 capsule by mouth once daily., Until Discontinued, Historical Med      tamsulosin (FLOMAX) 0.4 mg Cp24 Take 1 capsule (0.4 mg total) by mouth once daily., Starting  11/10/2014, Until Discontinued, Normal      trazodone (DESYREL) 50 MG tablet Take 50 mg by mouth every evening., Historical Med      ibuprofen (ADVIL,MOTRIN) 800 MG tablet Take 1 tablet (800 mg total) by mouth every 6 (six) hours as needed for Pain., Starting 4/20/2017, Until Discontinued, Print           Time spent on the discharge of patient: less than thirty minutes    Rachele Dill PA-C  Hospitalist-Department of Hospital Medicine  [address] [place] [address]  Office [phone] Pager [phone]

## 2017-08-22 NOTE — PROGRESS NOTES
Subjective:       Patient ID: Ar Perez is a 77 y.o. male.    Chief Complaint: Follow-up    [facility] Overall doing well since his last visit. He has hx of L sided CVA with residual R hemiparesis that has improved since since his last visit. He is still doing his therapy at home and has progressed nicely. He reports occassional coughing when he speaks or drinks liquids. He had difficulty with swallowing in the post stroke period and was instructed on prevention of aspiration by Speech Therapy which helped much. He has improved, but on occasion he experiences this issue. He is here at [facility] for cataract removal scheduled earlier this year.  Review of Systems   All other systems reviewed and are negative.      Objective:      Physical Exam   Constitutional: He is oriented to person, place, and time. He appears well-developed and well-nourished. No distress.   HENT:   Head: Normocephalic and atraumatic.   Right Ear: External ear normal.   Left Ear: External ear normal.   Mouth/Throat: Oropharynx is clear and moist. No oropharyngeal exudate.   Eyes: Conjunctivae and EOM are normal. Pupils are equal, round, and reactive to light. Right eye exhibits no discharge. Left eye exhibits no discharge. No scleral icterus.   Cataract OD.   Neck: Normal range of motion. Neck supple. No JVD present. No thyromegaly present.   Cardiovascular: Normal rate, regular rhythm, normal heart sounds and intact distal pulses.    Pulmonary/Chest: Effort normal and breath sounds normal. No respiratory distress. He has no wheezes. He exhibits no tenderness.   Abdominal: Soft. Bowel sounds are normal. He exhibits no distension and no mass. There is no tenderness.   Musculoskeletal: He exhibits no edema or tenderness.   Neurological: He is alert and oriented to person, place, and time. He exhibits abnormal muscle tone. Coordination abnormal.   Mild R hemiparesis - improved.  Impaired gag reflex..   Skin:  Skin is warm and dry. He is not diaphoretic.   Psychiatric: He has a normal mood and affect. His behavior is normal. Judgment and thought content normal.   Nursing note and vitals reviewed.      Assessment:       1. Essential hypertension    2. History of stroke with residual deficit    3. Pharyngeal dysphagia     4.    Cataract OD.   Plan:   1. Obtain labs.        2. Continue with current medications.        3. RTC for review post op cataract extraction.

## 2017-08-25 ENCOUNTER — OFFICE VISIT (OUTPATIENT)
Dept: OPHTHALMOLOGY | Facility: CLINIC | Age: 77
End: 2017-08-25

## 2017-08-25 ENCOUNTER — OFFICE VISIT (OUTPATIENT)
Dept: INTERNAL MEDICINE | Facility: CLINIC | Age: 77
End: 2017-08-25

## 2017-08-25 DIAGNOSIS — I10 ESSENTIAL HYPERTENSION: Primary | ICD-10-CM

## 2017-08-25 DIAGNOSIS — G81.91 RIGHT HEMIPARESIS: ICD-10-CM

## 2017-08-25 DIAGNOSIS — Z98.890 POST-OPERATIVE STATE: Primary | ICD-10-CM

## 2017-08-25 DIAGNOSIS — I63.9 CEREBROVASCULAR ACCIDENT (CVA), UNSPECIFIED MECHANISM: ICD-10-CM

## 2017-08-25 DIAGNOSIS — H25.11 NUCLEAR SCLEROSIS, RIGHT: ICD-10-CM

## 2017-08-25 PROCEDURE — 99212 OFFICE O/P EST SF 10 MIN: CPT | Mod: PBBFAC | Performed by: OPHTHALMOLOGY

## 2017-08-25 PROCEDURE — 1159F MED LIST DOCD IN RCRD: CPT | Mod: ,,, | Performed by: INTERNAL MEDICINE

## 2017-08-25 PROCEDURE — 99212 OFFICE O/P EST SF 10 MIN: CPT | Mod: PBBFAC,27 | Performed by: INTERNAL MEDICINE

## 2017-08-25 PROCEDURE — 99024 POSTOP FOLLOW-UP VISIT: CPT | Mod: ,,, | Performed by: OPHTHALMOLOGY

## 2017-08-25 PROCEDURE — 3008F BODY MASS INDEX DOCD: CPT | Mod: ,,, | Performed by: INTERNAL MEDICINE

## 2017-08-25 PROCEDURE — 99999 PR PBB SHADOW E&M-EST. PATIENT-LVL II: CPT | Mod: PBBFAC,,, | Performed by: OPHTHALMOLOGY

## 2017-08-25 PROCEDURE — 99999 PR PBB SHADOW E&M-EST. PATIENT-LVL II: CPT | Mod: PBBFAC,,, | Performed by: INTERNAL MEDICINE

## 2017-08-25 PROCEDURE — 99213 OFFICE O/P EST LOW 20 MIN: CPT | Mod: S$PBB,,, | Performed by: INTERNAL MEDICINE

## 2017-08-25 NOTE — PROGRESS NOTES
HPI     POD 1 PC IOL OD 8/14/17      Pt states [facility] OD is doing great. He needs to know what power glasses for   reading.     Eye meds:  P/g/N  TID        Last edited by Esther Monroe on 8/25/2017 11:01 AM. (History)            Assessment /Plan     For exam results, see Encounter Report.    Post-operative state    Nuclear sclerosis, right      Slit lamp exam:  L/L: nl  K: clear, wound sealed  AC: trace cell  Iris/Lens: IOL centered and stable    POW1 s/p phaco: Surgery healing well with no signs of infection or abnormal inflammation.   Excellent result OU

## 2017-08-28 ENCOUNTER — OFFICE VISIT (OUTPATIENT)
Dept: CARDIOLOGY | Facility: CLINIC | Age: 77
End: 2017-08-28

## 2017-08-28 VITALS
SYSTOLIC BLOOD PRESSURE: 131 MMHG | WEIGHT: 198.44 LBS | OXYGEN SATURATION: 98 % | BODY MASS INDEX: 29.3 KG/M2 | HEART RATE: 83 BPM | DIASTOLIC BLOOD PRESSURE: 61 MMHG

## 2017-08-28 DIAGNOSIS — R07.9 CHEST PAIN, UNSPECIFIED TYPE: Primary | ICD-10-CM

## 2017-08-28 DIAGNOSIS — I63.9 LEFT-SIDED CEREBROVASCULAR ACCIDENT (CVA): ICD-10-CM

## 2017-08-28 DIAGNOSIS — I10 ESSENTIAL HYPERTENSION: ICD-10-CM

## 2017-08-28 PROCEDURE — 99214 OFFICE O/P EST MOD 30 MIN: CPT | Mod: S$PBB,,, | Performed by: INTERNAL MEDICINE

## 2017-08-28 PROCEDURE — 1159F MED LIST DOCD IN RCRD: CPT | Mod: ,,, | Performed by: INTERNAL MEDICINE

## 2017-08-28 PROCEDURE — 3078F DIAST BP <80 MM HG: CPT | Mod: ,,, | Performed by: INTERNAL MEDICINE

## 2017-08-28 PROCEDURE — 99999 PR PBB SHADOW E&M-EST. PATIENT-LVL III: CPT | Mod: PBBFAC,,, | Performed by: INTERNAL MEDICINE

## 2017-08-28 PROCEDURE — 1126F AMNT PAIN NOTED NONE PRSNT: CPT | Mod: ,,, | Performed by: INTERNAL MEDICINE

## 2017-08-28 PROCEDURE — 99213 OFFICE O/P EST LOW 20 MIN: CPT | Mod: PBBFAC | Performed by: INTERNAL MEDICINE

## 2017-08-28 PROCEDURE — 3075F SYST BP GE 130 - 139MM HG: CPT | Mod: ,,, | Performed by: INTERNAL MEDICINE

## 2017-08-28 PROCEDURE — 3008F BODY MASS INDEX DOCD: CPT | Mod: ,,, | Performed by: INTERNAL MEDICINE

## 2017-08-28 NOTE — PROGRESS NOTES
Subjective:    Patient ID:  Ar Perez is a 77 y.o. male who presents for follow-up of Follow-up (1 week )      HPI  Patient is here for follow-up of recent hospitalization for chest pain and hypertensive urgency.  He had spikes in his blood pressure and this was somewhat adjusted by the addition of amlodipine.  He's been taking his blood pressure log at home and it looks very well-controlled.  He denies any further symptoms since discharge.  He underwent diagnostic testing including echocardiogram nuclear stress which were fairly unremarkable as below.  He denies any worsening chest pain, shortness of breath or palpitations.  He's express no PND, orthopnea or lower edema.  He's not expressing dizziness, presyncope or syncope.    Review of Systems   Constitution: Negative.   HENT: Negative.    Eyes: Negative.    Cardiovascular: Negative for chest pain, dyspnea on exertion, irregular heartbeat, leg swelling, near-syncope, orthopnea, palpitations, paroxysmal nocturnal dyspnea and syncope.   Respiratory: Negative for shortness of breath.    Skin: Negative.    Musculoskeletal: Negative.    Gastrointestinal: Negative for abdominal pain, constipation and diarrhea.   Genitourinary: Negative for dysuria.   Neurological: Negative for dizziness.   Psychiatric/Behavioral: Negative.         Objective:    Physical Exam   Constitutional: He is oriented to person, place, and time. He appears well-developed and well-nourished. No distress.   HENT:   Head: Normocephalic and atraumatic.   Eyes: Conjunctivae and EOM are normal. Pupils are equal, round, and reactive to light.   Neck: Normal range of motion. Neck supple. No thyromegaly present.   Cardiovascular: Normal rate, regular rhythm and normal heart sounds.    No murmur heard.  Pulmonary/Chest: Effort normal and breath sounds normal. No respiratory distress. He has no wheezes. He has no rales. He exhibits no tenderness.   Abdominal: Soft. Bowel sounds are normal.    Musculoskeletal: He exhibits no edema.   Neurological: He is alert and oriented to person, place, and time.   Skin: Skin is warm and dry.   Psychiatric: He has a normal mood and affect. His behavior is normal.         NST:  Impression: NORMAL MYOCARDIAL PERFUSION  1. The perfusion scan is free of evidence for myocardial ischemia or injury.   2. Resting wall motion is physiologic.   3. Resting LV function is normal.   4. The ventricular volumes are normal at rest and stress.   5. The extracardiac distribution of radioactivity is normal.     Echo:  CONCLUSIONS     1 - Low normal to mildly depressed left ventricular systolic function (EF 50-55%).     Assessment:       1. Chest pain, unspecified type    2. Essential hypertension    3. Left-sided cerebrovascular accident (CVA)         Plan:       -Mainly reassurance in light of testing  -Continue risk factor modification i.e. diet and exercise  -Continue follow SBP    Return to clinic in April of next year when he returns from [facility]

## 2017-09-11 VITALS — HEART RATE: 64 BPM | SYSTOLIC BLOOD PRESSURE: 142 MMHG | DIASTOLIC BLOOD PRESSURE: 80 MMHG

## 2017-09-11 NOTE — PROGRESS NOTES
Subjective:       Patient ID: Ar Perez is a 77 y.o. male.    Chief Complaint: Follow-up and Hypertension    Mackenzie Lemus is here today for hospital follow up. He was seen in ER after noting his BP's to be higher than usual associated with some L chest discomfort. His BP upon arriving at ER was elevated  And was admitted for stabilization. His BP was addressed while studies were obtained. These indicated no cardiac source to his pain, albeit minor elevation in troponin levels. He was placed in observation and dc'd the following morning with improved BP's. No adjustments were made to his medications at that time. Today he feels well. I discussed these events in detail and provided copies of the studies done for his files. He was advised to monitor his BP periodically, keep diary and continue with current regimen. He has hx of L CVA - s/p TPA in [facility] - with some residual R sided weakness. This is improving and I encouraged him to continue with his therapy as indicated. He is leaving for his country later this week and will keep in touch with me as before. He will RTC as scheduled in 6 months.  Review of Systems   All other systems reviewed and are negative.      Objective:      Physical Exam   Constitutional: He is oriented to person, place, and time. He appears well-developed and well-nourished. No distress.   Cardiovascular: Normal rate, regular rhythm, normal heart sounds and intact distal pulses.    Pulmonary/Chest: Effort normal and breath sounds normal. No respiratory distress. He has no wheezes.   Neurological: He is alert and oriented to person, place, and time. A cranial nerve deficit is present. Coordination abnormal.   Mild R facial droop 2o past L CVA.   Skin: He is not diaphoretic.   Psychiatric: He has a normal mood and affect. His behavior is normal. Judgment and thought content normal.   Nursing note and vitals reviewed.      Assessment:       1. Essential hypertension    2.  Cerebrovascular accident (CVA), unspecified mechanism    3. Right hemiparesis        Plan:    1. Continue with current medication regimen.         2. RTC 6 months.

## 2018-04-09 ENCOUNTER — TELEPHONE (OUTPATIENT)
Dept: INTERNAL MEDICINE | Facility: CLINIC | Age: 78
End: 2018-04-09

## 2018-04-09 DIAGNOSIS — R53.83 FATIGUE, UNSPECIFIED TYPE: ICD-10-CM

## 2018-04-09 DIAGNOSIS — I10 ESSENTIAL HYPERTENSION: Primary | ICD-10-CM

## 2018-04-09 DIAGNOSIS — E55.9 VITAMIN D DEFICIENCY: ICD-10-CM

## 2018-05-07 ENCOUNTER — OFFICE VISIT (OUTPATIENT)
Dept: INTERNAL MEDICINE | Facility: CLINIC | Age: 78
End: 2018-05-07

## 2018-05-07 VITALS
TEMPERATURE: 99 F | BODY MASS INDEX: 28.62 KG/M2 | SYSTOLIC BLOOD PRESSURE: 122 MMHG | HEART RATE: 64 BPM | DIASTOLIC BLOOD PRESSURE: 64 MMHG | WEIGHT: 193.81 LBS

## 2018-05-07 DIAGNOSIS — M70.71 BURSITIS OF RIGHT HIP, UNSPECIFIED BURSA: ICD-10-CM

## 2018-05-07 DIAGNOSIS — I10 ESSENTIAL HYPERTENSION: ICD-10-CM

## 2018-05-07 DIAGNOSIS — I69.30 HISTORY OF STROKE WITH RESIDUAL DEFICIT: ICD-10-CM

## 2018-05-07 DIAGNOSIS — Z00.00 ROUTINE GENERAL MEDICAL EXAMINATION AT A HEALTH CARE FACILITY: Primary | ICD-10-CM

## 2018-05-07 DIAGNOSIS — E78.2 MIXED HYPERLIPIDEMIA: ICD-10-CM

## 2018-05-07 PROCEDURE — 99214 OFFICE O/P EST MOD 30 MIN: CPT | Mod: S$PBB,,, | Performed by: INTERNAL MEDICINE

## 2018-05-07 PROCEDURE — 99999 PR PBB SHADOW E&M-EST. PATIENT-LVL III: CPT | Mod: PBBFAC,,, | Performed by: INTERNAL MEDICINE

## 2018-05-07 PROCEDURE — 99213 OFFICE O/P EST LOW 20 MIN: CPT | Mod: PBBFAC | Performed by: INTERNAL MEDICINE

## 2018-05-07 RX ORDER — ASPIRIN 325 MG
325 TABLET ORAL
COMMUNITY

## 2018-05-08 ENCOUNTER — OFFICE VISIT (OUTPATIENT)
Dept: OPTOMETRY | Facility: CLINIC | Age: 78
End: 2018-05-08

## 2018-05-08 ENCOUNTER — OFFICE VISIT (OUTPATIENT)
Dept: DERMATOLOGY | Facility: CLINIC | Age: 78
End: 2018-05-08

## 2018-05-08 ENCOUNTER — OFFICE VISIT (OUTPATIENT)
Dept: UROLOGY | Facility: CLINIC | Age: 78
End: 2018-05-08

## 2018-05-08 VITALS
BODY MASS INDEX: 29.06 KG/M2 | DIASTOLIC BLOOD PRESSURE: 58 MMHG | HEIGHT: 69 IN | WEIGHT: 196.19 LBS | HEART RATE: 69 BPM | SYSTOLIC BLOOD PRESSURE: 128 MMHG

## 2018-05-08 VITALS — BODY MASS INDEX: 28.94 KG/M2 | WEIGHT: 196 LBS

## 2018-05-08 DIAGNOSIS — I69.30 HISTORY OF STROKE WITH RESIDUAL DEFICIT: ICD-10-CM

## 2018-05-08 DIAGNOSIS — L81.4 LENTIGINES: ICD-10-CM

## 2018-05-08 DIAGNOSIS — R39.15 URINARY URGENCY: Primary | ICD-10-CM

## 2018-05-08 DIAGNOSIS — Z96.1 PSEUDOPHAKIA OF BOTH EYES: ICD-10-CM

## 2018-05-08 DIAGNOSIS — D18.00 ANGIOMA: ICD-10-CM

## 2018-05-08 DIAGNOSIS — N52.9 ERECTILE DYSFUNCTION, UNSPECIFIED ERECTILE DYSFUNCTION TYPE: ICD-10-CM

## 2018-05-08 DIAGNOSIS — L57.0 MULTIPLE ACTINIC KERATOSES: Primary | ICD-10-CM

## 2018-05-08 DIAGNOSIS — I63.9 CEREBROVASCULAR ACCIDENT (CVA), UNSPECIFIED MECHANISM: ICD-10-CM

## 2018-05-08 DIAGNOSIS — H02.883 MEIBOMIAN GLAND DYSFUNCTION (MGD) OF BOTH EYES: ICD-10-CM

## 2018-05-08 DIAGNOSIS — N40.0 BENIGN PROSTATIC HYPERPLASIA WITHOUT LOWER URINARY TRACT SYMPTOMS: ICD-10-CM

## 2018-05-08 DIAGNOSIS — L82.1 SEBORRHEIC KERATOSES: ICD-10-CM

## 2018-05-08 DIAGNOSIS — Z87.2 HISTORY OF ACTINIC KERATOSES: ICD-10-CM

## 2018-05-08 DIAGNOSIS — H52.4 PRESBYOPIA: ICD-10-CM

## 2018-05-08 DIAGNOSIS — H01.00B BLEPHARITIS OF UPPER AND LOWER EYELIDS OF BOTH EYES, UNSPECIFIED TYPE: Primary | ICD-10-CM

## 2018-05-08 DIAGNOSIS — B08.1 BATEMAN'S DISEASE: ICD-10-CM

## 2018-05-08 DIAGNOSIS — H02.886 MEIBOMIAN GLAND DYSFUNCTION (MGD) OF BOTH EYES: ICD-10-CM

## 2018-05-08 DIAGNOSIS — I10 ESSENTIAL HYPERTENSION: ICD-10-CM

## 2018-05-08 DIAGNOSIS — H00.15 CHALAZION LEFT LOWER EYELID: ICD-10-CM

## 2018-05-08 DIAGNOSIS — H01.00A BLEPHARITIS OF UPPER AND LOWER EYELIDS OF BOTH EYES, UNSPECIFIED TYPE: Primary | ICD-10-CM

## 2018-05-08 PROCEDURE — 92015 DETERMINE REFRACTIVE STATE: CPT | Mod: ,,, | Performed by: OPTOMETRIST

## 2018-05-08 PROCEDURE — 99999 PR PBB SHADOW E&M-EST. PATIENT-LVL III: CPT | Mod: PBBFAC,,, | Performed by: DERMATOLOGY

## 2018-05-08 PROCEDURE — 99202 OFFICE O/P NEW SF 15 MIN: CPT | Mod: S$PBB,,, | Performed by: DERMATOLOGY

## 2018-05-08 PROCEDURE — 99999 PR PBB SHADOW E&M-EST. PATIENT-LVL III: CPT | Mod: PBBFAC,,, | Performed by: UROLOGY

## 2018-05-08 PROCEDURE — 99213 OFFICE O/P EST LOW 20 MIN: CPT | Mod: PBBFAC,27 | Performed by: UROLOGY

## 2018-05-08 PROCEDURE — 99999 PR PBB SHADOW E&M-EST. PATIENT-LVL I: CPT | Mod: PBBFAC,,, | Performed by: OPTOMETRIST

## 2018-05-08 PROCEDURE — 99213 OFFICE O/P EST LOW 20 MIN: CPT | Mod: PBBFAC,27,PO | Performed by: DERMATOLOGY

## 2018-05-08 PROCEDURE — 92014 COMPRE OPH EXAM EST PT 1/>: CPT | Mod: S$PBB,,, | Performed by: OPTOMETRIST

## 2018-05-08 PROCEDURE — 99211 OFF/OP EST MAY X REQ PHY/QHP: CPT | Mod: PBBFAC | Performed by: OPTOMETRIST

## 2018-05-08 PROCEDURE — 99214 OFFICE O/P EST MOD 30 MIN: CPT | Mod: S$PBB,,, | Performed by: UROLOGY

## 2018-05-08 RX ORDER — FLUOROURACIL 50 MG/G
CREAM TOPICAL
Qty: 40 G | Refills: 3 | Status: SHIPPED | OUTPATIENT
Start: 2018-05-08

## 2018-05-08 RX ORDER — TAMSULOSIN HYDROCHLORIDE 0.4 MG/1
0.4 CAPSULE ORAL DAILY
Qty: 30 CAPSULE | Refills: 1 | Status: SHIPPED | OUTPATIENT
Start: 2018-05-08 | End: 2019-03-06 | Stop reason: SDUPTHER

## 2018-05-08 RX ORDER — TADALAFIL 5 MG/1
5 TABLET ORAL DAILY PRN
Qty: 30 TABLET | Refills: 12 | Status: SHIPPED | OUTPATIENT
Start: 2018-05-08 | End: 2019-03-06 | Stop reason: SDUPTHER

## 2018-05-08 NOTE — PROGRESS NOTES
Subjective:       Patient ID:  Ar Perez is a 78 y.o. male who presents for   Chief Complaint   Patient presents with    Skin Check     UBSE    Spot     bilateal arms      History of Present Illness: The patient presents with chief complaint of spots.  Location: arms   Duration: months  Signs/Symptoms: none    Prior treatments: none    Also pruritus scalp and back, lesions on face, would like skin check.           Review of Systems   Constitutional: Negative for fever.   Skin: Negative for itching and rash.   Hematologic/Lymphatic: Bruises/bleeds easily.        Objective:    Physical Exam   Constitutional: He appears well-developed and well-nourished. No distress.   Neurological: He is alert and oriented to person, place, and time. He is not disoriented.   Psychiatric: He has a normal mood and affect.   Skin:   Areas Examined (abnormalities noted in diagram):   Scalp / Hair Palpated and Inspected  Head / Face Inspection Performed  Neck Inspection Performed  Chest / Axilla Inspection Performed  Abdomen Inspection Performed  Back Inspection Performed  RUE Inspected  LUE Inspection Performed                   Diagram Legend     Erythematous scaling macule/papule c/w actinic keratosis       Vascular papule c/w angioma      Pigmented verrucoid papule/plaque c/w seborrheic keratosis      Yellow umbilicated papule c/w sebaceous hyperplasia      Irregularly shaped tan macule c/w lentigo     1-2 mm smooth white papules consistent with Milia      Movable subcutaneous cyst with punctum c/w epidermal inclusion cyst      Subcutaneous movable cyst c/w pilar cyst      Firm pink to brown papule c/w dermatofibroma      Pedunculated fleshy papule(s) c/w skin tag(s)      Evenly pigmented macule c/w junctional nevus     Mildly variegated pigmented, slightly irregular-bordered macule c/w mildly atypical nevus      Flesh colored to evenly pigmented papule c/w intradermal nevus       Pink pearly papule/plaque c/w basal  "cell carcinoma      Erythematous hyperkeratotic cursted plaque c/w SCC      Surgical scar with no sign of skin cancer recurrence      Open and closed comedones      Inflammatory papules and pustules      Verrucoid papule consistent consistent with wart     Erythematous eczematous patches and plaques     Dystrophic onycholytic nail with subungual debris c/w onychomycosis     Umbilicated papule    Erythematous-base heme-crusted tan verrucoid plaque consistent with inflamed seborrheic keratosis     Erythematous Silvery Scaling Plaque c/w Psoriasis     See annotation      Assessment / Plan:        Multiple actinic keratoses  -     fluorouracil (EFUDEX) 5 % cream; Use hs for 2 weeks  Dispense: 40 g; Refill: 3    Seborrheic keratoses  reassurance      Angiomas  reassurance      Lentigines  The "ABCD" rules to observe pigmented lesions were reviewed.      Beltran's disease    Pruritus back and scalp  cerave with pramoxine             Follow-up in about 1 year (around 5/8/2019).  "

## 2018-05-08 NOTE — PROGRESS NOTES
HPI     DSL- 4/24/17     Pt is here for routine eye exam. Pt wants to make sure his current MRX is   correct. Pt states OS has been swollen, itchy, and red X few months. Pt   denies floaters and flashes. Glare is a bother.     eyemed  No gtt    Last edited by Romina Morris on 5/8/2018  1:17 PM. (History)            Assessment /Plan     For exam results, see Encounter Report.    Blepharitis of upper and lower eyelids of both eyes, unspecified type  Meibomian gland dysfunction (MGD) of both eyes  Chalazion left lower eyelid   LId scrubs daily    Pseudophakia of both eyes  Presbyopia  Rx specs      Good internal ocular health, monitor yearly    RTC 1 year

## 2018-05-08 NOTE — PROGRESS NOTES
Subjective:       Patient ID: Ar Perez is a 78 y.o. male.    Chief Complaint: Urinary Frequency (f/u)     Ar Perez is a 78 y.o. Male with BPH on flomax and finasteride stable for last 7 years.  Complaint of orchitis treated with doxy last year.    H/o left epididymectomy.     BPH symptoms stable. On flomax once daily. Could not tolerate twice daily.   Slightly weaker stream.   Nocturia x3. No extremity edema. He does not limit fluids at night. No caffeine at night.   Good stream.  No straining.   No gross hematuria, recurrent UTI.  No intermittency, hesitancy.  Some urgency. Urinary frequency every 3 hours. Has urinary urgency and incontinence if he can't make it in time.  Had UTI last year. Took antibiotic.   He had a stroke and is on aspirin. His pcp doesn't want him to be under anesthesia.     ED - tried cialis. It worked, but he has used     GFR 60.    Lab Results       Component                Value               Date                       PSA                      0.89                05/07/2018                 PSA                      0.89                04/21/2017                 PSA                      0.90                04/20/2015                 PSA                      1.2                 07/21/2014                 PSA                      1.41                08/07/2013                 PSA                      1.43                10/16/2012                 PSA                      1.6                 06/13/2011                 PSA                      1.7                 09/07/2010                 PSA                      3.6                 09/04/2009                 PSA                      10 (H)              08/05/2008                    Benign Prostatic Hypertrophy   Irritative symptoms include frequency, nocturia and urgency. Pertinent negatives include no chills or dysuria.   Urinary Frequency    Associated symptoms include frequency and urgency. Pertinent negatives include no  chills, constipation or rash.       Past Surgical History:   Procedure Laterality Date    ADENOIDECTOMY      APPENDECTOMY      CATARACT EXTRACTION  6-    os    CATARACT EXTRACTION W/  INTRAOCULAR LENS IMPLANT Right 08/14/2017    Dr. Hooper    EPIDIDYMECTOMY      EYE SURGERY      SHOULDER ARTHROSCOPY      SHOULDER SURGERY      TONSILLECTOMY         Past Medical History:   Diagnosis Date    Arthritis     BPH (benign prostatic hypertrophy) with urinary obstruction     Cataract     right eye    Essential hypertension 10/16/2012    Hypertension     PONV (postoperative nausea and vomiting)     Stroke     Urinary tract infection        Social History     Social History    Marital status:      Spouse name: N/A    Number of children: N/A    Years of education: N/A     Occupational History    Not on file.     Social History Main Topics    Smoking status: Never Smoker    Smokeless tobacco: Never Used    Alcohol use No      Comment: socially    Drug use: No    Sexual activity: No     Other Topics Concern    Not on file     Social History Narrative    No narrative on file       Family History   Problem Relation Age of Onset    Cancer Mother     Cataracts Mother     Heart disease Father     Heart attack Father     Cancer Brother     Amblyopia Neg Hx     Blindness Neg Hx     Glaucoma Neg Hx     Macular degeneration Neg Hx     Retinal detachment Neg Hx     Strabismus Neg Hx     Melanoma Neg Hx        Current Outpatient Prescriptions   Medication Sig Dispense Refill    aspirin 325 MG tablet Take 325 mg by mouth once daily.      atorvastatin (LIPITOR) 40 MG tablet Take 1 tablet (40 mg total) by mouth once daily. 90 tablet 3    cholecalciferol, vitamin D3, 50,000 unit capsule Take by mouth once a week. 12 capsule 0    CYANOCOBALAMIN, VITAMIN B-12, (VITAMIN B-12 ORAL) Take 1 tablet by mouth once daily.      docusate sodium (COLACE) 100 MG capsule Take 1 capsule (100 mg total) by  mouth 2 (two) times daily.  0    finasteride (PROSCAR) 5 mg tablet Take 1 tablet (5 mg total) by mouth once daily. 30 tablet 1    fluticasone (FLONASE) 50 mcg/actuation nasal spray 1 spray by Each Nare route every evening.      ibuprofen (ADVIL,MOTRIN) 800 MG tablet Take 1 tablet (800 mg total) by mouth every 6 (six) hours as needed for Pain. 90 tablet 3    losartan (COZAAR) 50 MG tablet Take 1 tablet (50 mg total) by mouth once daily. (Patient taking differently: Take 25 mg by mouth once daily. ) 15 tablet 0    multivitamin capsule Take 1 capsule by mouth once daily.      tamsulosin (FLOMAX) 0.4 mg Cp24 Take 1 capsule (0.4 mg total) by mouth once daily. 30 capsule 1    trazodone (DESYREL) 50 MG tablet Take 50 mg by mouth every evening.       Current Facility-Administered Medications   Medication Dose Route Frequency Provider Last Rate Last Dose    doxycycline tablet 100 mg  100 mg Oral 1 time in Clinic/HOD Yaritza Rubio MD           Review of patient's allergies indicates:   Allergen Reactions    Bactrim [sulfamethoxazole-trimethoprim] Rash    Ambien [zolpidem] Rash       Review of Systems   Constitutional: Negative for chills, fever and unexpected weight change.   HENT: Negative for hearing loss and nosebleeds.    Eyes: Negative for visual disturbance.   Respiratory: Negative for chest tightness.    Cardiovascular: Negative for chest pain.   Gastrointestinal: Negative for constipation and diarrhea.   Genitourinary: Positive for frequency, nocturia and urgency. Negative for dysuria.   Musculoskeletal: Positive for arthralgias. Negative for joint swelling.   Skin: Negative for rash.   Neurological: Negative for seizures.   Hematological: Does not bruise/bleed easily.   Psychiatric/Behavioral: Negative for behavioral problems.       Objective:      Physical Exam   Constitutional: He is oriented to person, place, and time. He appears well-developed and well-nourished.   HENT:   Head: Normocephalic  and atraumatic.   Eyes: No scleral icterus.   Neck: Neck supple.   Cardiovascular: Normal rate and regular rhythm.    Pulmonary/Chest: Effort normal. No respiratory distress.   Abdominal: He exhibits no mass. Hernia confirmed negative in the right inguinal area and confirmed negative in the left inguinal area.   Genitourinary: Testes normal and penis normal. Circumcised.   Genitourinary Comments: Prostate was smooth without nodularity. No rectal masses.  45 grams.  No external hemorrhoids.   Normal perineum.      Musculoskeletal: He exhibits no tenderness.   Lymphadenopathy:     He has no cervical adenopathy. No inguinal adenopathy noted on the right or left side.   Neurological: He is alert and oriented to person, place, and time.   Skin: Skin is warm. No rash noted.     Psychiatric: He has a normal mood and affect.     urine dip clear  Assessment:       1. Urinary urgency    2. Benign prostatic hyperplasia without lower urinary tract symptoms    3. Cerebrovascular accident (CVA), unspecified mechanism    4. Essential hypertension    5. Erectile dysfunction, unspecified erectile dysfunction type    6. History of stroke with residual deficit        Plan:     continue flomax. Refilled. Trial of myrbetriq.  Continue once cialis. Xu.   F/u 1 year.

## 2018-05-09 ENCOUNTER — OFFICE VISIT (OUTPATIENT)
Dept: ORTHOPEDICS | Facility: CLINIC | Age: 78
End: 2018-05-09

## 2018-05-09 VITALS
BODY MASS INDEX: 29.03 KG/M2 | WEIGHT: 196 LBS | DIASTOLIC BLOOD PRESSURE: 71 MMHG | SYSTOLIC BLOOD PRESSURE: 150 MMHG | HEIGHT: 69 IN | HEART RATE: 62 BPM

## 2018-05-09 DIAGNOSIS — M70.61 GREATER TROCHANTERIC BURSITIS OF RIGHT HIP: Primary | ICD-10-CM

## 2018-05-09 DIAGNOSIS — M25.551 RIGHT HIP PAIN: ICD-10-CM

## 2018-05-09 PROCEDURE — 99213 OFFICE O/P EST LOW 20 MIN: CPT | Mod: 25,S$PBB,, | Performed by: PHYSICIAN ASSISTANT

## 2018-05-09 PROCEDURE — 20610 DRAIN/INJ JOINT/BURSA W/O US: CPT | Mod: S$PBB,RT,, | Performed by: PHYSICIAN ASSISTANT

## 2018-05-09 PROCEDURE — 20610 DRAIN/INJ JOINT/BURSA W/O US: CPT | Mod: PBBFAC | Performed by: PHYSICIAN ASSISTANT

## 2018-05-09 PROCEDURE — 99999 PR PBB SHADOW E&M-EST. PATIENT-LVL III: CPT | Mod: PBBFAC,,, | Performed by: PHYSICIAN ASSISTANT

## 2018-05-09 PROCEDURE — 99213 OFFICE O/P EST LOW 20 MIN: CPT | Mod: PBBFAC,25 | Performed by: PHYSICIAN ASSISTANT

## 2018-05-09 RX ORDER — TRIAMCINOLONE ACETONIDE 40 MG/ML
60 INJECTION, SUSPENSION INTRA-ARTICULAR; INTRAMUSCULAR
Status: COMPLETED | OUTPATIENT
Start: 2018-05-09 | End: 2018-05-09

## 2018-05-09 RX ADMIN — TRIAMCINOLONE ACETONIDE 60 MG: 40 INJECTION, SUSPENSION INTRA-ARTICULAR; INTRAMUSCULAR at 04:05

## 2018-05-10 NOTE — PROGRESS NOTES
"CC:   right hip pain    HPI:  78 y.o. yo male who presents with right hip pain.  Patient is here for the week from [facility]. He states the pain is mostly on the outside of his hip.  His been present for the greater part of the year.  It happened as he began rehabilitation for a stroke.  There was no event it just began.  The pain radiates down the lateral aspect of his hip and sometimes into his buttocks and down his buttocks.  The pain is sharp and stabbing.  It's made worse when he stands for too long.  Dr. Dacosta gave him an injection one year ago into GT bursa which helped. Pain has returned. He was also evaluated by back & spine; no treatment. He admits to some LBP.     PAST MEDICAL HISTORY:   Past Medical History:   Diagnosis Date    Arthritis     BPH (benign prostatic hypertrophy) with urinary obstruction     Cataract     right eye    Essential hypertension 10/16/2012    Hypertension     PONV (postoperative nausea and vomiting)     Stroke     Urinary tract infection      VITAL SIGNS: BP (!) 150/71 (BP Location: Right arm, Patient Position: Sitting, BP Method: Medium (Automatic))   Pulse 62   Ht 5' 9" (1.753 m)   Wt 88.9 kg (195 lb 15.8 oz)   BMI 28.94 kg/m²      Review of Systems   Constitution: Negative. Negative for chills, fever and night sweats.   HENT: Negative for congestion and headaches.    Eyes: Negative for blurred vision, left vision loss and right vision loss.   Cardiovascular: Negative for chest pain and syncope.   Respiratory: Negative for cough and shortness of breath.    Endocrine: Negative for polydipsia, polyphagia and polyuria.   Hematologic/Lymphatic: Negative for bleeding problem. Does not bruise/bleed easily.   Skin: Negative for dry skin, itching and rash.   Musculoskeletal: Negative for falls and muscle weakness.  right hip pain  Gastrointestinal: Negative for abdominal pain and bowel incontinence.   Genitourinary: Negative for bladder incontinence and nocturia. "   Neurological: Negative for disturbances in coordination, loss of balance and seizures.   Psychiatric/Behavioral: Negative for depression. The patient does not have insomnia.    Allergic/Immunologic: Negative for hives and persistent infections.       Physical Exam   Constitutional: Oriented to person, place, and time. Appears well-developed and well-nourished.   HENT:   Head: Normocephalic and atraumatic.   Nose: Nose normal.   Eyes: No scleral icterus.   Neck: Normal range of motion. Neck supple.   Cardiovascular: Normal rate and regular rhythm.    Pulses:       Radial pulses are 2+ on the right side, and 2+ on the left side.   Pulmonary/Chest: Clear and normal  Abdominal: Soft.   Neurological: Alert and oriented to person, place, and time.   Skin: Skin is warm.   Psychiatric: Normal mood and affect.     Back    ROM shows normal flexion, extension and rotation    Palpation shows no masses    Stability is normal    Strength to flexion and extension well maintained     Core strength is diminished    Skin shows no rashes or cafe au lait spots    Sensation intact to light touch     Right Lower Extremity     Alignment: Neutral    ROM 0-120    Palpation shows no masses    Palpation shows no masses;     Tenderness: He is tenderness palpation at his right greater trochanter.  There is no pain with internal and external rotation of the hip.    Hip ROM:      Flexion:90     Internal Rotation: 30     External Rotation: 60    Knee stability: Stable to varus and valgus force, acl intact    Skin shows no rashes, lesions or cafe au lait spots    Sensation intact to light touch      Xrays:  X-rays of the bilateral hips are reviewed revaling mild osteoarthritis bilateral hips.  CAM lesions bilateral hips.  There are degenerative changes of lumbar spine.    Assessment:  Encounter Diagnoses   Name Primary?    Greater trochanteric bursitis of right hip Yes    Right hip pain        Plan:  Greater troch bursa injection helped relieve  pain in the past. Will repeat. If pain does not improve, will obtain MRI as recommended by back & spine. Pt has PT in [facility]. List of low back/core strengthening exercises given to patient.   F/u as needed.    Greater Trochanter Bursa Injection  Procedure Note    Pre-operative Diagnosis: right greater trochanteric bursitis    Post-operative Diagnosis: same    Indications: right hip pain    Anesthesia: none    Procedure Details     Verbal consent was obtained for the procedure. The injection site was identified and the skin was prepared with alcohol. The right hip was injected from a lateral approach with 1.5 ml of Kenalog and 2 ml Lidocaine under sterile technique using a 25 gauge 1 1/2 inch needle. The needle was removed and the area cleansed and dressed.    Complications:  None; patient tolerated the procedure well.  There was instantaneous relief of pain in lateral hip.     he was advised to rest the leg today, using ice and Tylenol as needed for comfort and swelling. he may have an increase in discomfort tonight followed by steady improvement over the next several days. It may take 1-3 weeks following the injection to get the full benefit of the medication.

## 2018-05-10 NOTE — PROGRESS NOTES
Subjective:       Patient ID: Ar Perez is a 78 y.o. male.    Chief Complaint: Annual Exam    Mackenzie Dunbar is here today for follow up and annual exam. Overall doing well. He reported issues with bruising easily. He takes ASA daily which is the likely cause of that. He has had several test for this [place] which have been negative. He denies bleeding tendencies and reassurance was provided. He also reports pain in his R hip. On exam, he has tenderness at the R Greater Trochanter bursa. I will make appt with Ortho for possible steroid injection. He agrees. He has hx of L sided CVA with some residual R neuro deficits. This has improved however and he is still doing PT at home. He notes occassional chocking when he speaks much or eats rapidly, but overall well.   Review of Systems   All other systems reviewed and are negative.      Objective:      Physical Exam   Constitutional: He is oriented to person, place, and time. He appears well-developed and well-nourished. No distress.   HENT:   Head: Normocephalic and atraumatic.   Right Ear: External ear normal.   Left Ear: External ear normal.   Mouth/Throat: Oropharynx is clear and moist. No oropharyngeal exudate.   Eyes: Conjunctivae and EOM are normal. Pupils are equal, round, and reactive to light. Right eye exhibits no discharge. Left eye exhibits no discharge. No scleral icterus.   Neck: Normal range of motion. Neck supple. No JVD present. No thyromegaly present.   Cardiovascular: Normal rate, regular rhythm, normal heart sounds and intact distal pulses.    No murmur heard.  Pulmonary/Chest: Effort normal and breath sounds normal. No respiratory distress. He has no wheezes.   Abdominal: Soft. Bowel sounds are normal. He exhibits no distension and no mass. There is no tenderness.   Musculoskeletal: Normal range of motion. He exhibits tenderness. He exhibits no edema.   R Hip bursa pain.   Lymphadenopathy:     He has no cervical adenopathy.    Neurological: He is alert and oriented to person, place, and time.   Skin: Skin is warm and dry. Rash noted. He is not diaphoretic.   Erythematous macules on forearms.   Psychiatric: He has a normal mood and affect. His behavior is normal. Judgment and thought content normal.   Nursing note and vitals reviewed.      Assessment:       1. Routine general medical examination at a health care facility    2. History of stroke with residual deficit    3. Essential hypertension    4. Mixed hyperlipidemia    5. Bursitis of right hip, unspecified bursa     6.     Dermatitis  Plan:    1. Obtain blood work.         2. Refer to Orthopedics.         3. Refer to Dermatology.         4. RTC for review.

## 2018-05-11 ENCOUNTER — TELEPHONE (OUTPATIENT)
Dept: UROLOGY | Facility: CLINIC | Age: 78
End: 2018-05-11

## 2018-05-11 ENCOUNTER — PATIENT MESSAGE (OUTPATIENT)
Dept: UROLOGY | Facility: CLINIC | Age: 78
End: 2018-05-11

## 2018-05-11 RX ORDER — TROSPIUM CHLORIDE ER 60 MG/1
60 CAPSULE ORAL DAILY
Qty: 30 CAPSULE | Refills: 11 | Status: SHIPPED | OUTPATIENT
Start: 2018-05-11 | End: 2019-03-06 | Stop reason: SDUPTHER

## 2018-05-11 NOTE — TELEPHONE ENCOUNTER
sanctura XR sent.   ----- Message from Mame Ness LPN sent at 5/11/2018  3:15 PM CDT -----  Contact: self  [phone]      ----- Message -----  From: Elsy Ortega MA  Sent: 5/11/2018   3:05 PM  To: [facility] Staff    Rx Refill/Request    Who Called:  patient  Refill or New Rx:  New per pt  RX Name and Strength: sanctura  How is the patient currently taking it? (ex. 1XDay): na  Is this a 30 day or 90 day RX:na  Preferred Pharmacy with phone number:  [email or web address] [phone]  [facility] or [facility] Order:  [facility]  Ordering Provider: lianne castillo  Communication Preference ([employer] response to Pt. (or) Call Back # and timeframe):  Phone# above  Additional Information:  redd

## 2018-05-15 ENCOUNTER — TELEPHONE (OUTPATIENT)
Dept: UROLOGY | Facility: CLINIC | Age: 78
End: 2018-05-15

## 2018-05-15 NOTE — TELEPHONE ENCOUNTER
----- Message from Luz Sweeney LPN sent at 5/14/2018  1:29 PM CDT -----  Contact: self  [phone]  Pt needs medication sent to [facility]  ----- Message -----  From: Elsy Ortega MA  Sent: 5/14/2018  12:17 PM  To: [facility]ami[facility] RUIZ Staff    States he is calling for the script that was to be sent to [facility] on Friday, 5-11-18    [facility]    Call when done.

## 2018-05-15 NOTE — TELEPHONE ENCOUNTER
Spoke with patient. Called pharmacy and transferred  medication to the correct pharmacy per patient.

## 2018-05-18 RX ORDER — TRAZODONE HYDROCHLORIDE 50 MG/1
50 TABLET ORAL NIGHTLY
Qty: 100 TABLET | Refills: 0 | Status: SHIPPED | OUTPATIENT
Start: 2018-05-18

## 2019-02-22 ENCOUNTER — TELEPHONE (OUTPATIENT)
Dept: INTERNAL MEDICINE | Facility: CLINIC | Age: 79
End: 2019-02-22

## 2019-02-22 DIAGNOSIS — I10 ESSENTIAL HYPERTENSION: Primary | ICD-10-CM

## 2019-03-01 ENCOUNTER — HOSPITAL ENCOUNTER (OUTPATIENT)
Dept: CARDIOLOGY | Facility: CLINIC | Age: 79
Discharge: HOME | End: 2019-03-01

## 2019-03-01 ENCOUNTER — HOSPITAL ENCOUNTER (OUTPATIENT)
Dept: RADIOLOGY | Facility: HOSPITAL | Age: 79
Discharge: HOME | End: 2019-03-01
Attending: INTERNAL MEDICINE

## 2019-03-01 ENCOUNTER — OFFICE VISIT (OUTPATIENT)
Dept: INTERNAL MEDICINE | Facility: CLINIC | Age: 79
End: 2019-03-01

## 2019-03-01 ENCOUNTER — LAB VISIT (OUTPATIENT)
Dept: LAB | Facility: HOSPITAL | Age: 79
End: 2019-03-01
Attending: INTERNAL MEDICINE

## 2019-03-01 ENCOUNTER — TELEPHONE (OUTPATIENT)
Dept: INTERNAL MEDICINE | Facility: CLINIC | Age: 79
End: 2019-03-01

## 2019-03-01 VITALS
HEART RATE: 88 BPM | SYSTOLIC BLOOD PRESSURE: 118 MMHG | BODY MASS INDEX: 29.68 KG/M2 | DIASTOLIC BLOOD PRESSURE: 60 MMHG | WEIGHT: 201 LBS

## 2019-03-01 DIAGNOSIS — I63.9 CEREBROVASCULAR ACCIDENT (CVA), UNSPECIFIED MECHANISM: Primary | ICD-10-CM

## 2019-03-01 DIAGNOSIS — N64.4 BREAST PAIN, LEFT: ICD-10-CM

## 2019-03-01 DIAGNOSIS — R05.9 COUGH: Primary | ICD-10-CM

## 2019-03-01 DIAGNOSIS — N40.1 BENIGN PROSTATIC HYPERPLASIA WITH LOWER URINARY TRACT SYMPTOMS, SYMPTOM DETAILS UNSPECIFIED: ICD-10-CM

## 2019-03-01 DIAGNOSIS — I10 ESSENTIAL HYPERTENSION: ICD-10-CM

## 2019-03-01 DIAGNOSIS — R05.9 COUGH: ICD-10-CM

## 2019-03-01 LAB
25(OH)D3+25(OH)D2 SERPL-MCNC: 34 NG/ML
ALBUMIN SERPL BCP-MCNC: 3.9 G/DL
ALP SERPL-CCNC: 103 U/L
ALT SERPL W/O P-5'-P-CCNC: 31 U/L
ANION GAP SERPL CALC-SCNC: 6 MMOL/L
AST SERPL-CCNC: 25 U/L
BASOPHILS # BLD AUTO: 0.04 K/UL
BASOPHILS NFR BLD: 0.7 %
BILIRUB SERPL-MCNC: 0.8 MG/DL
BUN SERPL-MCNC: 14 MG/DL
CALCIUM SERPL-MCNC: 9.1 MG/DL
CHLORIDE SERPL-SCNC: 105 MMOL/L
CHOLEST SERPL-MCNC: 117 MG/DL
CHOLEST/HDLC SERPL: 2.6 {RATIO}
CO2 SERPL-SCNC: 29 MMOL/L
COMPLEXED PSA SERPL-MCNC: 1.3 NG/ML
CREAT SERPL-MCNC: 1.1 MG/DL
DIFFERENTIAL METHOD: NORMAL
EOSINOPHIL # BLD AUTO: 0.1 K/UL
EOSINOPHIL NFR BLD: 2.4 %
ERYTHROCYTE [DISTWIDTH] IN BLOOD BY AUTOMATED COUNT: 13.3 %
EST. GFR  (AFRICAN AMERICAN): >60 ML/MIN/1.73 M^2
EST. GFR  (NON AFRICAN AMERICAN): >60 ML/MIN/1.73 M^2
ESTIMATED AVG GLUCOSE: 114 MG/DL
GLUCOSE SERPL-MCNC: 99 MG/DL
HBA1C MFR BLD HPLC: 5.6 %
HCT VFR BLD AUTO: 42.6 %
HDLC SERPL-MCNC: 45 MG/DL
HDLC SERPL: 38.5 %
HGB BLD-MCNC: 14 G/DL
IMM GRANULOCYTES # BLD AUTO: 0.02 K/UL
IMM GRANULOCYTES NFR BLD AUTO: 0.3 %
LDLC SERPL CALC-MCNC: 56.6 MG/DL
LYMPHOCYTES # BLD AUTO: 1.7 K/UL
LYMPHOCYTES NFR BLD: 27.9 %
MCH RBC QN AUTO: 29.9 PG
MCHC RBC AUTO-ENTMCNC: 32.9 G/DL
MCV RBC AUTO: 91 FL
MONOCYTES # BLD AUTO: 0.5 K/UL
MONOCYTES NFR BLD: 9 %
NEUTROPHILS # BLD AUTO: 3.5 K/UL
NEUTROPHILS NFR BLD: 59.7 %
NONHDLC SERPL-MCNC: 72 MG/DL
NRBC BLD-RTO: 0 /100 WBC
PLATELET # BLD AUTO: 199 K/UL
PMV BLD AUTO: 10.6 FL
POTASSIUM SERPL-SCNC: 4.2 MMOL/L
PROT SERPL-MCNC: 6.3 G/DL
RBC # BLD AUTO: 4.68 M/UL
SODIUM SERPL-SCNC: 140 MMOL/L
TRIGL SERPL-MCNC: 77 MG/DL
TSH SERPL DL<=0.005 MIU/L-ACNC: 1.74 UIU/ML
WBC # BLD AUTO: 5.91 K/UL

## 2019-03-01 PROCEDURE — 85025 COMPLETE CBC W/AUTO DIFF WBC: CPT

## 2019-03-01 PROCEDURE — 84443 ASSAY THYROID STIM HORMONE: CPT

## 2019-03-01 PROCEDURE — 93010 EKG 12-LEAD: ICD-10-PCS | Mod: S$PBB,,, | Performed by: INTERNAL MEDICINE

## 2019-03-01 PROCEDURE — 84153 ASSAY OF PSA TOTAL: CPT

## 2019-03-01 PROCEDURE — 71046 X-RAY EXAM CHEST 2 VIEWS: CPT | Mod: TC,FY

## 2019-03-01 PROCEDURE — 83036 HEMOGLOBIN GLYCOSYLATED A1C: CPT

## 2019-03-01 PROCEDURE — 82306 VITAMIN D 25 HYDROXY: CPT

## 2019-03-01 PROCEDURE — 99999 PR PBB SHADOW E&M-EST. PATIENT-LVL III: CPT | Mod: PBBFAC,,, | Performed by: INTERNAL MEDICINE

## 2019-03-01 PROCEDURE — 77066 DX MAMMO INCL CAD BI: CPT | Mod: 26,,, | Performed by: RADIOLOGY

## 2019-03-01 PROCEDURE — 77066 DX MAMMO INCL CAD BI: CPT | Mod: TC,PO

## 2019-03-01 PROCEDURE — 77066 MAMMO DIGITAL DIAGNOSTIC BILAT WITH CAD: ICD-10-PCS | Mod: 26,,, | Performed by: RADIOLOGY

## 2019-03-01 PROCEDURE — 71046 X-RAY EXAM CHEST 2 VIEWS: CPT | Mod: 26,,, | Performed by: RADIOLOGY

## 2019-03-01 PROCEDURE — 71046 XR CHEST PA AND LATERAL: ICD-10-PCS | Mod: 26,,, | Performed by: RADIOLOGY

## 2019-03-01 PROCEDURE — 93010 ELECTROCARDIOGRAM REPORT: CPT | Mod: S$PBB,,, | Performed by: INTERNAL MEDICINE

## 2019-03-01 PROCEDURE — 93005 ELECTROCARDIOGRAM TRACING: CPT | Mod: PBBFAC | Performed by: INTERNAL MEDICINE

## 2019-03-01 PROCEDURE — 36415 COLL VENOUS BLD VENIPUNCTURE: CPT

## 2019-03-01 PROCEDURE — 80061 LIPID PANEL: CPT

## 2019-03-01 PROCEDURE — 99214 PR OFFICE/OUTPT VISIT, EST, LEVL IV, 30-39 MIN: ICD-10-PCS | Mod: S$PBB,,, | Performed by: INTERNAL MEDICINE

## 2019-03-01 PROCEDURE — 80053 COMPREHEN METABOLIC PANEL: CPT

## 2019-03-01 PROCEDURE — 99214 OFFICE O/P EST MOD 30 MIN: CPT | Mod: S$PBB,,, | Performed by: INTERNAL MEDICINE

## 2019-03-01 PROCEDURE — 99999 PR PBB SHADOW E&M-EST. PATIENT-LVL III: ICD-10-PCS | Mod: PBBFAC,,, | Performed by: INTERNAL MEDICINE

## 2019-03-01 PROCEDURE — 99213 OFFICE O/P EST LOW 20 MIN: CPT | Mod: PBBFAC,25 | Performed by: INTERNAL MEDICINE

## 2019-03-01 NOTE — PROGRESS NOTES
Subjective:       Patient ID: Ar Perez is a 79 y.o. male.    Chief Complaint: Annual Exam    Mackenzie Perez is here today for his annual exam. Overall doing well. He reports issues with mild pain in his R hip noticeable at various times. Last visit he as dx'd with bursitis of that area and had a steroid injection which helped for some time. He is active physically in spite of his stroke 2 years ago and exercises regularly. He places more emphasis on his R side when doing activities as he CVA affected the L side of his body. I offered him a repeat injection, but he declined. He laso hs noted some tenderness to his L nipple for about 1 month. Is local MD suggested the Proscar to be the cause and recommended he stop ot which he did. The discomfort abated some, but still present. He is concerned about a malignancy in that area. On exam, I did not palpate any abnormal masses in both breasts. He was tender in th L nipple, but no obvious visible skin changes are noted. I will obtain an US of his L breast and proceed from there.   Regarding his BPH/LUTS, for which the Proscar was given, he has noted some decrease in hos FOS and has some nocturia as well. Blood work done today showed normal PSA level at 1.3 compared to 0.89 last year. I will make arrangements for him to be seen in Urology.  Regarding his stroke, he has done well since then, but still notes some issues with his gait that do not interfere with his activities and is able to swallow w/o difficulty except for occassional cough while drinking water. Otherwise he has no issues in that regard  Review of Systems   All other systems reviewed and are negative.      Objective:      Physical Exam   Constitutional: He is oriented to person, place, and time. He appears well-developed and well-nourished. No distress.   HENT:   Head: Normocephalic and atraumatic.   Right Ear: External ear normal.   Left Ear: External ear normal.   Mouth/Throat: Oropharynx is clear  and moist. No oropharyngeal exudate.   Eyes: Conjunctivae and EOM are normal. Pupils are equal, round, and reactive to light. Right eye exhibits no discharge. Left eye exhibits no discharge. No scleral icterus.   Neck: Normal range of motion. Neck supple. No JVD present. No thyromegaly present.   Cardiovascular: Normal rate, regular rhythm, normal heart sounds and intact distal pulses.   No murmur heard.  Pulmonary/Chest: Effort normal and breath sounds normal. No respiratory distress. He has no wheezes.   Abdominal: Soft. Bowel sounds are normal. He exhibits no distension and no mass. There is no tenderness.   Musculoskeletal: Normal range of motion. He exhibits tenderness. He exhibits no edema.   Mild tenderness R hip bursa.   Lymphadenopathy:     He has no cervical adenopathy.   Neurological: He is alert and oriented to person, place, and time. No cranial nerve deficit.   Skin: Skin is warm and dry. No rash noted. He is not diaphoretic. No erythema.   No breast masses bilaterally. Mild tenderrnes L nipple.   Psychiatric: He has a normal mood and affect. His behavior is normal. Thought content normal.   Nursing note and vitals reviewed.      Assessment:       1. Cerebrovascular accident (CVA), unspecified mechanism    2. Breast pain, left    3. Essential hypertension    4. Benign prostatic hyperplasia with lower urinary tract symptoms, symptom details unspecified     5.     Hip pain.  Plan:    1. Obtain L breast US.         2. Refer to Urology.         3. RTC for final review.

## 2019-03-06 ENCOUNTER — OFFICE VISIT (OUTPATIENT)
Dept: UROLOGY | Facility: CLINIC | Age: 79
End: 2019-03-06

## 2019-03-06 VITALS
SYSTOLIC BLOOD PRESSURE: 171 MMHG | BODY MASS INDEX: 28.92 KG/M2 | HEART RATE: 64 BPM | DIASTOLIC BLOOD PRESSURE: 70 MMHG | HEIGHT: 70 IN | TEMPERATURE: 98 F | WEIGHT: 202 LBS

## 2019-03-06 DIAGNOSIS — R35.0 URINARY FREQUENCY: ICD-10-CM

## 2019-03-06 DIAGNOSIS — N39.41 URGE INCONTINENCE: ICD-10-CM

## 2019-03-06 DIAGNOSIS — N13.8 BPH WITH URINARY OBSTRUCTION: Primary | ICD-10-CM

## 2019-03-06 DIAGNOSIS — N40.1 BPH WITH URINARY OBSTRUCTION: Primary | ICD-10-CM

## 2019-03-06 DIAGNOSIS — N52.01 ERECTILE DYSFUNCTION DUE TO ARTERIAL INSUFFICIENCY: ICD-10-CM

## 2019-03-06 LAB
BILIRUB SERPL-MCNC: NORMAL MG/DL
BLOOD URINE, POC: NORMAL
COLOR, POC UA: YELLOW
GLUCOSE UR QL STRIP: NORMAL
KETONES UR QL STRIP: NORMAL
LEUKOCYTE ESTERASE URINE, POC: NORMAL
NITRITE, POC UA: NORMAL
PH, POC UA: 5
POC RESIDUAL URINE VOLUME: NORMAL ML (ref 0–100)
PROTEIN, POC: NORMAL
SPECIFIC GRAVITY, POC UA: 1
UROBILINOGEN, POC UA: NORMAL

## 2019-03-06 PROCEDURE — 99999 PR PBB SHADOW E&M-EST. PATIENT-LVL IV: ICD-10-PCS | Mod: PBBFAC,,, | Performed by: NURSE PRACTITIONER

## 2019-03-06 PROCEDURE — 81002 URINALYSIS NONAUTO W/O SCOPE: CPT | Mod: PBBFAC | Performed by: NURSE PRACTITIONER

## 2019-03-06 PROCEDURE — 51798 US URINE CAPACITY MEASURE: CPT | Mod: PBBFAC | Performed by: NURSE PRACTITIONER

## 2019-03-06 PROCEDURE — 99214 OFFICE O/P EST MOD 30 MIN: CPT | Mod: PBBFAC,25 | Performed by: NURSE PRACTITIONER

## 2019-03-06 PROCEDURE — 99214 OFFICE O/P EST MOD 30 MIN: CPT | Mod: S$PBB,,, | Performed by: NURSE PRACTITIONER

## 2019-03-06 PROCEDURE — 99214 PR OFFICE/OUTPT VISIT, EST, LEVL IV, 30-39 MIN: ICD-10-PCS | Mod: S$PBB,,, | Performed by: NURSE PRACTITIONER

## 2019-03-06 PROCEDURE — 99999 PR PBB SHADOW E&M-EST. PATIENT-LVL IV: CPT | Mod: PBBFAC,,, | Performed by: NURSE PRACTITIONER

## 2019-03-06 RX ORDER — TADALAFIL 5 MG/1
5 TABLET ORAL DAILY PRN
Qty: 30 TABLET | Refills: 12 | Status: SHIPPED | OUTPATIENT
Start: 2019-03-06 | End: 2022-07-08 | Stop reason: SDUPTHER

## 2019-03-06 RX ORDER — TROSPIUM CHLORIDE ER 60 MG/1
60 CAPSULE ORAL DAILY
Qty: 90 CAPSULE | Refills: 3 | Status: SHIPPED | OUTPATIENT
Start: 2019-03-06 | End: 2023-05-16

## 2019-03-06 RX ORDER — TAMSULOSIN HYDROCHLORIDE 0.4 MG/1
0.4 CAPSULE ORAL DAILY
Qty: 90 CAPSULE | Refills: 3 | Status: SHIPPED | OUTPATIENT
Start: 2019-03-06 | End: 2019-06-04

## 2019-03-06 NOTE — PATIENT INSTRUCTIONS
BPH (Enlarged Prostate)  The prostate is a gland at the base of the bladder. As some men get older, the prostate may get bigger in size. This problem is called benign prostatic hyperplasia (BPH). BPH puts pressure on the urethra. This is the tube that carries urine from the bladder to the penis. It may interfere with the flow of urine. It may also keep the bladder from emptying fully.    Symptoms of BPH include trouble starting urination and feeling as though the bladder isnt emptying all the way. It also includes a weak urine stream, dribbling and leaking of urine, and frequent and urgent urination (especially at night). BPH can increase the risk of urinary infections. It can also block off urine flow completely. If this occurs, a thin tube (catheter) may be passed into the bladder to help drain urine.  If symptoms are mild, no treatment may be needed right now. If symptoms are more severe, treatment is likely needed. The goal of treatment is to improve urine flow and reduce symptoms. Treatments can include medicine and procedures. Your healthcare provider will discuss treatment options with you as needed.  Home care  The following guidelines will help you care for yourself at home:  · Urinate as soon as you feel the urge. Don't try to hold your urine.  · Don't limit your fluid intake during the day. Drink 6 to 8 glasses of water or liquids a day. This prevents bacteria from building up in the bladder.  · Avoid drinking fluids after dinner to help reduce urination during the night.  · Avoid medicines that can worsen your symptoms. These include certain cold and allergy medicines and antidepressants. Diuretics used for high blood pressure can also worsen symptoms. Talk to your doctor about the medicines you take. Other choices may work better for you.  Prostate cancer screening  BPH does not increase the risk of prostate cancer. But because prostate cancer is a common cancer in men, screening is sometimes  recommended. This may help detect the cancer in its early stages when treatment is most effective. Factors that can increase the risk of prostate cancer include being -American or having a father or brother who had prostate cancer. A high-fat diet may also increase the risk of prostate cancer. Talk to your healthcare provider to see whether you should be screened for prostate cancer.  Follow-up care  Follow up with your healthcare provider, or as advised  To learn more, go to:  · National Kidney & Urologic Diseases Information Clearinghouse  kidney.niddk.nih.gov, 992.973.8495  When to seek medical advice  Call your healthcare provider right away if any of these occur:  · Fever of 100.4°F (38.0°C) or higher, or as advised  · Unable to pass urine for 8 hours  · Increasing pressure or pain in your bladder (lower abdomen)  · Blood in the urine  · Increasing low back pain, not related to injury  · Symptoms of urinary infection (increased urge to urinate, burning when passing urine, foul-smelling urine)  Date Last Reviewed: 7/1/2016  © 9264-0252 [employer]. [address] [place] [address]. All rights reserved. This information is not intended as a substitute for professional medical care. Always follow your healthcare professional's instructions.        Prostate Problems and Related Urinary Symptoms    Many men have problems with the prostate at some time in their lives. The prostate gland is part of the male reproductive system. Its located just below the bladder. The prostate surrounds the urethra (the tube that carries urine and semen out of the body). The main function of the prostate gland is to add fluid to the semen. When problems occur in the prostate, the bladder and urethra are often affected as well. The most common prostate problems are described below.  BPH  BPH (benign prostatic hyperplasia) develops when changing hormone levels cause the prostate to grow larger. This often  starts around age 50. Excess tissue can block the urethra, making it harder for urine to flow. The enlarged prostate can also press on the bladder, so you may need to urinate more often. Other symptoms include straining during urination, a weak urine stream, urinating more at night, incontinence, dribbling at the end of urination, and feeling that the bladder isnt emptying all the way. Note that BPH is not cancer and does not cause cancer.  How BPH affects the bladder  Pushing to urinate through a narrowed urethra can cause the bladder walls to thicken or stretch out of shape. A stretched bladder may have problems emptying all the way. If urine stays in the bladder longer than it should, you may develop an infection or bladder stones. Also, the kidneys cant drain properly into a bladder that doesnt empty completely. This can lead to kidney failure. Pressure from urine buildup can also cause leaking of urine (called overflow incontinence).  Other prostate problems  · Prostatitis is an infection or inflammation that causes the prostate to become painful and swollen. The swelling narrows the urethra and can block the bladder neck. Prostatitis can cause a burning sensation during urination. You may also feel pressure or pain in the genital area. In some cases, prostatitis can cause fever and chills, and can make you very sick.  · Cancer occurs when abnormal cells form a tumor (a lump of cells that grow uncontrolled). Some tumors can be felt during a physical exam, others cant. Prostate cancer often causes no symptoms at all, especially in its early stages. Prostate symptoms are more likely to be caused by a problem that is NOT cancer.   Date Last Reviewed: 1/1/2017 © 2000-2017 [employer]. [address] [place] [address], [place] [address]. All rights reserved. This information is not intended as a substitute for professional medical care. Always follow your healthcare professional's instructions.

## 2019-03-07 ENCOUNTER — OFFICE VISIT (OUTPATIENT)
Dept: INTERNAL MEDICINE | Facility: CLINIC | Age: 79
End: 2019-03-07

## 2019-03-07 DIAGNOSIS — Z71.89 ENCOUNTER FOR MEDICATION REVIEW AND COUNSELING: Primary | ICD-10-CM

## 2019-03-07 PROCEDURE — 99213 OFFICE O/P EST LOW 20 MIN: CPT | Mod: S$PBB,,, | Performed by: INTERNAL MEDICINE

## 2019-03-07 PROCEDURE — 99213 PR OFFICE/OUTPT VISIT, EST, LEVL III, 20-29 MIN: ICD-10-PCS | Mod: S$PBB,,, | Performed by: INTERNAL MEDICINE

## 2019-03-07 PROCEDURE — 99999 PR PBB SHADOW E&M-EST. PATIENT-LVL II: CPT | Mod: PBBFAC,,, | Performed by: INTERNAL MEDICINE

## 2019-03-07 PROCEDURE — 99999 PR PBB SHADOW E&M-EST. PATIENT-LVL II: ICD-10-PCS | Mod: PBBFAC,,, | Performed by: INTERNAL MEDICINE

## 2019-03-07 PROCEDURE — 99212 OFFICE O/P EST SF 10 MIN: CPT | Mod: PBBFAC | Performed by: INTERNAL MEDICINE

## 2019-03-20 NOTE — PROGRESS NOTES
Mr Perez is here today for his review. I gave him copies of his studies and discussed them in detail including blood work that was unremarkable including CMP, CBC, lipids, TSH, PSA. MMG showed no worrisome issues and CXR was negative as well. He will continue with his current medications and RTC 1 year or sooner if needed.

## 2021-09-22 NOTE — PROCEDURES
Large Joint Aspiration/Injection  Date/Time: 4/20/2017 10:10 AM  Performed by: TOO PABON  Authorized by: TOO PABON     Consent Done?:  Yes (Written)  Indications:  Pain  Procedure site marked: Yes    Timeout: Prior to procedure the correct patient, procedure, and site was verified      Location:  Hip  Site:  R greater trochanteric bursa  Prep: Patient was prepped and draped in usual sterile fashion    Needle size:  22 G  Approach:  Lateral  Medications:  40 mg triamcinolone acetonide 40 mg/mL  Patient tolerance:  Patient tolerated the procedure well with no immediate complications      
DISPLAY PLAN FREE TEXT

## 2022-04-27 DIAGNOSIS — N40.0 BENIGN PROSTATIC HYPERPLASIA WITHOUT LOWER URINARY TRACT SYMPTOMS: ICD-10-CM

## 2022-04-27 DIAGNOSIS — I10 ESSENTIAL HYPERTENSION: Primary | ICD-10-CM

## 2022-04-27 DIAGNOSIS — E78.2 MIXED HYPERLIPIDEMIA: ICD-10-CM

## 2022-06-27 ENCOUNTER — OFFICE VISIT (OUTPATIENT)
Dept: INTERNAL MEDICINE | Facility: CLINIC | Age: 82
End: 2022-06-27

## 2022-06-27 ENCOUNTER — HOSPITAL ENCOUNTER (OUTPATIENT)
Dept: CARDIOLOGY | Facility: CLINIC | Age: 82
Discharge: HOME | End: 2022-06-27

## 2022-06-27 ENCOUNTER — LAB VISIT (OUTPATIENT)
Dept: LAB | Facility: HOSPITAL | Age: 82
End: 2022-06-27
Attending: INTERNAL MEDICINE

## 2022-06-27 DIAGNOSIS — R10.10 PAIN OF UPPER ABDOMEN: ICD-10-CM

## 2022-06-27 DIAGNOSIS — N40.0 BENIGN PROSTATIC HYPERPLASIA WITHOUT LOWER URINARY TRACT SYMPTOMS: ICD-10-CM

## 2022-06-27 DIAGNOSIS — I10 HYPERTENSION, UNSPECIFIED TYPE: ICD-10-CM

## 2022-06-27 DIAGNOSIS — Z00.00 ROUTINE GENERAL MEDICAL EXAMINATION AT A HEALTH CARE FACILITY: Primary | ICD-10-CM

## 2022-06-27 DIAGNOSIS — E78.2 MIXED HYPERLIPIDEMIA: ICD-10-CM

## 2022-06-27 DIAGNOSIS — I10 ESSENTIAL HYPERTENSION: ICD-10-CM

## 2022-06-27 DIAGNOSIS — Z00.00 ROUTINE GENERAL MEDICAL EXAMINATION AT A HEALTH CARE FACILITY: ICD-10-CM

## 2022-06-27 LAB
ALBUMIN SERPL BCP-MCNC: 3.8 G/DL (ref 3.5–5.2)
ALP SERPL-CCNC: 86 U/L (ref 55–135)
ALT SERPL W/O P-5'-P-CCNC: 14 U/L (ref 10–44)
ANION GAP SERPL CALC-SCNC: 10 MMOL/L (ref 8–16)
AST SERPL-CCNC: 17 U/L (ref 10–40)
BASOPHILS # BLD AUTO: 0.04 K/UL (ref 0–0.2)
BASOPHILS NFR BLD: 0.6 % (ref 0–1.9)
BILIRUB SERPL-MCNC: 0.7 MG/DL (ref 0.1–1)
BUN SERPL-MCNC: 17 MG/DL (ref 8–23)
CALCIUM SERPL-MCNC: 9.1 MG/DL (ref 8.7–10.5)
CHLORIDE SERPL-SCNC: 106 MMOL/L (ref 95–110)
CHOLEST SERPL-MCNC: 122 MG/DL (ref 120–199)
CHOLEST/HDLC SERPL: 3.1 {RATIO} (ref 2–5)
CO2 SERPL-SCNC: 25 MMOL/L (ref 23–29)
COMPLEXED PSA SERPL-MCNC: 3.8 NG/ML (ref 0–4)
CREAT SERPL-MCNC: 1.2 MG/DL (ref 0.5–1.4)
DIFFERENTIAL METHOD: ABNORMAL
EOSINOPHIL # BLD AUTO: 0.3 K/UL (ref 0–0.5)
EOSINOPHIL NFR BLD: 4.7 % (ref 0–8)
ERYTHROCYTE [DISTWIDTH] IN BLOOD BY AUTOMATED COUNT: 12.9 % (ref 11.5–14.5)
EST. GFR  (AFRICAN AMERICAN): >60 ML/MIN/1.73 M^2
EST. GFR  (NON AFRICAN AMERICAN): 55.2 ML/MIN/1.73 M^2
ESTIMATED AVG GLUCOSE: 105 MG/DL (ref 68–131)
GLUCOSE SERPL-MCNC: 101 MG/DL (ref 70–110)
HBA1C MFR BLD: 5.3 % (ref 4–5.6)
HCT VFR BLD AUTO: 42.1 % (ref 40–54)
HDLC SERPL-MCNC: 40 MG/DL (ref 40–75)
HDLC SERPL: 32.8 % (ref 20–50)
HGB BLD-MCNC: 13.9 G/DL (ref 14–18)
IMM GRANULOCYTES # BLD AUTO: 0.02 K/UL (ref 0–0.04)
IMM GRANULOCYTES NFR BLD AUTO: 0.3 % (ref 0–0.5)
LDLC SERPL CALC-MCNC: 66.6 MG/DL (ref 63–159)
LYMPHOCYTES # BLD AUTO: 1.8 K/UL (ref 1–4.8)
LYMPHOCYTES NFR BLD: 28.8 % (ref 18–48)
MCH RBC QN AUTO: 30.5 PG (ref 27–31)
MCHC RBC AUTO-ENTMCNC: 33 G/DL (ref 32–36)
MCV RBC AUTO: 92 FL (ref 82–98)
MONOCYTES # BLD AUTO: 0.5 K/UL (ref 0.3–1)
MONOCYTES NFR BLD: 7.9 % (ref 4–15)
NEUTROPHILS # BLD AUTO: 3.6 K/UL (ref 1.8–7.7)
NEUTROPHILS NFR BLD: 57.7 % (ref 38–73)
NONHDLC SERPL-MCNC: 82 MG/DL
NRBC BLD-RTO: 0 /100 WBC
PLATELET # BLD AUTO: 191 K/UL (ref 150–450)
PMV BLD AUTO: 10.8 FL (ref 9.2–12.9)
POTASSIUM SERPL-SCNC: 4.1 MMOL/L (ref 3.5–5.1)
PROT SERPL-MCNC: 6.5 G/DL (ref 6–8.4)
RBC # BLD AUTO: 4.56 M/UL (ref 4.6–6.2)
SODIUM SERPL-SCNC: 141 MMOL/L (ref 136–145)
TRIGL SERPL-MCNC: 77 MG/DL (ref 30–150)
TSH SERPL DL<=0.005 MIU/L-ACNC: 0.93 UIU/ML (ref 0.4–4)
WBC # BLD AUTO: 6.32 K/UL (ref 3.9–12.7)

## 2022-06-27 PROCEDURE — 84153 ASSAY OF PSA TOTAL: CPT | Performed by: INTERNAL MEDICINE

## 2022-06-27 PROCEDURE — 99999 PR PBB SHADOW E&M-EST. PATIENT-LVL III: ICD-10-PCS | Mod: PBBFAC,,, | Performed by: INTERNAL MEDICINE

## 2022-06-27 PROCEDURE — 80061 LIPID PANEL: CPT | Performed by: INTERNAL MEDICINE

## 2022-06-27 PROCEDURE — 99999 PR PBB SHADOW E&M-EST. PATIENT-LVL III: CPT | Mod: PBBFAC,,, | Performed by: INTERNAL MEDICINE

## 2022-06-27 PROCEDURE — 36415 COLL VENOUS BLD VENIPUNCTURE: CPT | Performed by: INTERNAL MEDICINE

## 2022-06-27 PROCEDURE — 99387 PR PREVENTIVE VISIT,NEW,65 & OVER: ICD-10-PCS | Mod: S$PBB,,, | Performed by: INTERNAL MEDICINE

## 2022-06-27 PROCEDURE — 83036 HEMOGLOBIN GLYCOSYLATED A1C: CPT | Performed by: INTERNAL MEDICINE

## 2022-06-27 PROCEDURE — 93005 ELECTROCARDIOGRAM TRACING: CPT | Mod: PBBFAC | Performed by: INTERNAL MEDICINE

## 2022-06-27 PROCEDURE — 93010 EKG 12-LEAD: ICD-10-PCS | Mod: S$PBB,,, | Performed by: INTERNAL MEDICINE

## 2022-06-27 PROCEDURE — 99213 OFFICE O/P EST LOW 20 MIN: CPT | Mod: PBBFAC | Performed by: INTERNAL MEDICINE

## 2022-06-27 PROCEDURE — 84443 ASSAY THYROID STIM HORMONE: CPT | Performed by: INTERNAL MEDICINE

## 2022-06-27 PROCEDURE — 85025 COMPLETE CBC W/AUTO DIFF WBC: CPT | Performed by: INTERNAL MEDICINE

## 2022-06-27 PROCEDURE — 99387 INIT PM E/M NEW PAT 65+ YRS: CPT | Mod: S$PBB,,, | Performed by: INTERNAL MEDICINE

## 2022-06-27 PROCEDURE — 80053 COMPREHEN METABOLIC PANEL: CPT | Performed by: INTERNAL MEDICINE

## 2022-06-27 PROCEDURE — 93010 ELECTROCARDIOGRAM REPORT: CPT | Mod: S$PBB,,, | Performed by: INTERNAL MEDICINE

## 2022-06-27 RX ORDER — TAMSULOSIN HYDROCHLORIDE 0.4 MG/1
CAPSULE ORAL DAILY
COMMUNITY
End: 2022-07-08 | Stop reason: SDUPTHER

## 2022-06-27 RX ORDER — SIMVASTATIN 20 MG/1
20 TABLET, FILM COATED ORAL NIGHTLY
COMMUNITY

## 2022-06-28 ENCOUNTER — OFFICE VISIT (OUTPATIENT)
Dept: UROLOGY | Facility: CLINIC | Age: 82
End: 2022-06-28

## 2022-06-28 ENCOUNTER — OFFICE VISIT (OUTPATIENT)
Dept: ORTHOPEDICS | Facility: CLINIC | Age: 82
End: 2022-06-28

## 2022-06-28 ENCOUNTER — HOSPITAL ENCOUNTER (OUTPATIENT)
Dept: RADIOLOGY | Facility: HOSPITAL | Age: 82
Discharge: HOME | End: 2022-06-28
Attending: PHYSICIAN ASSISTANT

## 2022-06-28 VITALS
SYSTOLIC BLOOD PRESSURE: 137 MMHG | WEIGHT: 197.75 LBS | BODY MASS INDEX: 28.31 KG/M2 | DIASTOLIC BLOOD PRESSURE: 55 MMHG | HEIGHT: 70 IN | HEART RATE: 62 BPM

## 2022-06-28 VITALS — WEIGHT: 197.75 LBS | BODY MASS INDEX: 28.31 KG/M2 | HEIGHT: 70 IN

## 2022-06-28 DIAGNOSIS — N40.1 BPH WITH URINARY OBSTRUCTION: Primary | ICD-10-CM

## 2022-06-28 DIAGNOSIS — M25.551 HIP PAIN, RIGHT: Primary | ICD-10-CM

## 2022-06-28 DIAGNOSIS — N13.8 BPH WITH URINARY OBSTRUCTION: Primary | ICD-10-CM

## 2022-06-28 DIAGNOSIS — M25.551 HIP PAIN, RIGHT: ICD-10-CM

## 2022-06-28 PROCEDURE — 99213 OFFICE O/P EST LOW 20 MIN: CPT | Mod: PBBFAC,27 | Performed by: PHYSICIAN ASSISTANT

## 2022-06-28 PROCEDURE — 99203 PR OFFICE/OUTPT VISIT, NEW, LEVL III, 30-44 MIN: ICD-10-PCS | Mod: S$PBB,,, | Performed by: PHYSICIAN ASSISTANT

## 2022-06-28 PROCEDURE — 99214 OFFICE O/P EST MOD 30 MIN: CPT | Mod: PBBFAC | Performed by: UROLOGY

## 2022-06-28 PROCEDURE — 99203 OFFICE O/P NEW LOW 30 MIN: CPT | Mod: S$PBB,,, | Performed by: PHYSICIAN ASSISTANT

## 2022-06-28 PROCEDURE — 99999 PR PBB SHADOW E&M-EST. PATIENT-LVL III: CPT | Mod: PBBFAC,,, | Performed by: PHYSICIAN ASSISTANT

## 2022-06-28 PROCEDURE — 73502 X-RAY EXAM HIP UNI 2-3 VIEWS: CPT | Mod: 26,RT,, | Performed by: RADIOLOGY

## 2022-06-28 PROCEDURE — 99204 OFFICE O/P NEW MOD 45 MIN: CPT | Mod: S$PBB,,, | Performed by: UROLOGY

## 2022-06-28 PROCEDURE — 99999 PR PBB SHADOW E&M-EST. PATIENT-LVL III: ICD-10-PCS | Mod: PBBFAC,,, | Performed by: PHYSICIAN ASSISTANT

## 2022-06-28 PROCEDURE — 99999 PR PBB SHADOW E&M-EST. PATIENT-LVL IV: ICD-10-PCS | Mod: PBBFAC,,, | Performed by: UROLOGY

## 2022-06-28 PROCEDURE — 99999 PR PBB SHADOW E&M-EST. PATIENT-LVL IV: CPT | Mod: PBBFAC,,, | Performed by: UROLOGY

## 2022-06-28 PROCEDURE — 73502 XR HIP WITH PELVIS WHEN PERFORMED, 2 OR 3  VIEWS RIGHT: ICD-10-PCS | Mod: 26,RT,, | Performed by: RADIOLOGY

## 2022-06-28 PROCEDURE — 99204 PR OFFICE/OUTPT VISIT, NEW, LEVL IV, 45-59 MIN: ICD-10-PCS | Mod: S$PBB,,, | Performed by: UROLOGY

## 2022-06-28 PROCEDURE — 73502 X-RAY EXAM HIP UNI 2-3 VIEWS: CPT | Mod: TC,RT

## 2022-06-28 NOTE — PROGRESS NOTES
Subjective:       Patient ID: Ar Perez is a 82 y.o. male.    Chief Complaint: bph with urinary obstruction (Last urology visit was 03/06/2019  routine  check up pt flomax for about 20 years nocturia is getting worse he state the prostate is enlarge.)    HPI janay he has a good stream and empties his bladder .  He also takes low-dose Cialis to help him void  ent is here for his yearly prostate check.  Takes Flomax and Sanctura.  He was on Proscar but had gynecomastia in has stopped that.  He gets up 3 times a night and that bothers him.  He drinks a lot of fluids in the evening and afternoon.    Past Medical History:   Diagnosis Date    Arthritis     BPH (benign prostatic hypertrophy) with urinary obstruction     Cataract     right eye    Essential hypertension 10/16/2012    Hypertension     PONV (postoperative nausea and vomiting)     Stroke     Urinary tract infection        Past Surgical History:   Procedure Laterality Date    ADENOIDECTOMY      APPENDECTOMY      CATARACT EXTRACTION  6-    os    CATARACT EXTRACTION W/  INTRAOCULAR LENS IMPLANT Right 08/14/2017    Dr. Hooper    EPIDIDYMECTOMY      EYE SURGERY      SHOULDER ARTHROSCOPY      SHOULDER SURGERY      TONSILLECTOMY         Family History   Problem Relation Age of Onset    Cancer Mother     Cataracts Mother     Heart disease Father     Heart attack Father     Cancer Brother     Amblyopia Neg Hx     Blindness Neg Hx     Glaucoma Neg Hx     Macular degeneration Neg Hx     Retinal detachment Neg Hx     Strabismus Neg Hx     Melanoma Neg Hx        Social History     Socioeconomic History    Marital status:    Tobacco Use    Smoking status: Never Smoker    Smokeless tobacco: Never Used   Substance and Sexual Activity    Alcohol use: No     Comment: socially    Drug use: No    Sexual activity: Never       Allergies:  Bactrim [sulfamethoxazole-trimethoprim] and Ambien  [zolpidem]    Medications:    Current Outpatient Medications:     aspirin 325 MG tablet, Take 325 mg by mouth once daily., Disp: , Rfl:     cholecalciferol, vitamin D3, 50,000 unit capsule, Take by mouth once a week., Disp: 12 capsule, Rfl: 0    CYANOCOBALAMIN, VITAMIN B-12, (VITAMIN B-12 ORAL), Take 1 tablet by mouth once daily., Disp: , Rfl:     docusate sodium (COLACE) 100 MG capsule, Take 1 capsule (100 mg total) by mouth 2 (two) times daily., Disp: , Rfl: 0    fluorouracil (EFUDEX) 5 % cream, Use hs for 2 weeks, Disp: 40 g, Rfl: 3    fluticasone (FLONASE) 50 mcg/actuation nasal spray, 1 spray by Each Nare route every evening., Disp: , Rfl:     ibuprofen (ADVIL,MOTRIN) 800 MG tablet, Take 1 tablet (800 mg total) by mouth every 6 (six) hours as needed for Pain., Disp: 90 tablet, Rfl: 3    losartan (COZAAR) 50 MG tablet, Take 1 tablet (50 mg total) by mouth once daily. (Patient taking differently: Take 25 mg by mouth once daily.), Disp: 15 tablet, Rfl: 0    multivitamin capsule, Take 1 capsule by mouth once daily., Disp: , Rfl:     simvastatin (ZOCOR) 20 MG tablet, Take 20 mg by mouth every evening., Disp: , Rfl:     tamsulosin (FLOMAX) 0.4 mg Cap, Take by mouth once daily., Disp: , Rfl:     traZODone (DESYREL) 50 MG tablet, Take 1 tablet (50 mg total) by mouth every evening., Disp: 100 tablet, Rfl: 0    tadalafil (CIALIS) 5 MG tablet, Take 1 tablet (5 mg total) by mouth daily as needed for Erectile Dysfunction., Disp: 30 tablet, Rfl: 12    trospium (SANCTURA XR) 60 mg Cp24 capsule, Take 1 capsule (60 mg total) by mouth once daily., Disp: 90 capsule, Rfl: 3    UNABLE TO FIND, medication name: PROBIOTIC, Disp: , Rfl:     Review of Systems   Constitutional: Negative for activity change, appetite change, chills, diaphoresis, fatigue, fever and unexpected weight change.   HENT: Negative for congestion, dental problem, hearing loss, mouth sores, postnasal drip, rhinorrhea, sinus pressure and trouble  swallowing.    Eyes: Negative for pain, discharge and itching.   Respiratory: Negative for apnea, cough, choking, chest tightness, shortness of breath and wheezing.    Cardiovascular: Negative for chest pain, palpitations and leg swelling.   Gastrointestinal: Negative for abdominal distention, abdominal pain, anal bleeding, blood in stool, constipation, diarrhea, nausea, rectal pain and vomiting.   Endocrine: Negative for polydipsia and polyuria.   Genitourinary: Positive for decreased urine volume and frequency. Negative for difficulty urinating, dysuria, enuresis, flank pain, genital sores, hematuria, penile discharge, penile pain, penile swelling, scrotal swelling, testicular pain and urgency.   Musculoskeletal: Negative for arthralgias, back pain and myalgias.   Skin: Negative for color change, rash and wound.   Neurological: Negative for dizziness, syncope, speech difficulty, light-headedness and headaches.   Hematological: Negative for adenopathy. Does not bruise/bleed easily.   Psychiatric/Behavioral: Negative for behavioral problems, confusion, hallucinations and sleep disturbance.       Objective:      Physical Exam  Constitutional:       Appearance: He is well-developed.   HENT:      Head: Normocephalic.   Cardiovascular:      Rate and Rhythm: Normal rate.   Pulmonary:      Effort: Pulmonary effort is normal.   Abdominal:      Palpations: Abdomen is soft.   Genitourinary:     Prostate: Normal.      Comments: 35-40 g smooth no nodules  Skin:     General: Skin is warm.   Neurological:      Mental Status: He is alert.         Assessment:       1. BPH with urinary obstruction        Plan:   Decrease afternoon and evening fluids.  He will tried to take 2 Flomax pills per day .  He is comfortable with these recommendations and return in 1 year or sooner p.r.n.

## 2022-06-28 NOTE — PROGRESS NOTES
Subjective:      Patient ID: Ar Perez is a 82 y.o. male.    Chief Complaint: Pain of the Right Hip    HPI  82 year old male presents with chief complaint of right hip pain x couple of years. Pain is at the lateral aspect. He has pain if he walks a lot. He takes otc medicine with mild relief. He had trochanteric bursa CSI in the past with short relief. No PT has been done. He reports LBP. He denies groin pain. He has h/o stroke that affected his right side. He does not use assistive devices. He lives in [facility].   Review of Systems   Constitutional: Negative for chills, fever and night sweats.   Cardiovascular: Negative for chest pain.   Respiratory: Negative for cough and shortness of breath.    Hematologic/Lymphatic: Does not bruise/bleed easily.   Skin: Negative for color change.   Gastrointestinal: Negative for heartburn.   Genitourinary: Negative for dysuria.   Neurological: Negative for numbness and paresthesias.   Psychiatric/Behavioral: Negative for altered mental status.   Allergic/Immunologic: Negative for persistent infections.         Objective:            General    Vitals reviewed.  Constitutional: He is oriented to person, place, and time. He appears well-developed and well-nourished.   Cardiovascular: Normal rate.    Neurological: He is alert and oriented to person, place, and time.             Right Hip Exam     Range of Motion   The patient has normal right hip ROM.    Tests   Stinchfield test: negative  Log Roll: negative    Other   Sensation: normal    Comments:  No TTP.       Muscle Strength   Right Lower Extremity   Hip Flexion: 4/5       X-ray was ordered and independently reviewed by me. Mild OA seen at both hips.        Assessment:       Encounter Diagnosis   Name Primary?    Hip pain, right Yes          Plan:       Discussed treatment options with patient. Explained that most of his symptoms are likely coming from his lower back. He was given a hip HEP. If he does not improve,  he should f/u with back/spine clinic for evaluation. RTC prn.

## 2022-06-29 ENCOUNTER — TELEPHONE (OUTPATIENT)
Dept: ORTHOPEDICS | Facility: CLINIC | Age: 82
End: 2022-06-29

## 2022-06-29 DIAGNOSIS — M51.36 DDD (DEGENERATIVE DISC DISEASE), LUMBAR: Primary | ICD-10-CM

## 2022-06-30 ENCOUNTER — HOSPITAL ENCOUNTER (OUTPATIENT)
Dept: RADIOLOGY | Facility: HOSPITAL | Age: 82
Discharge: HOME | End: 2022-06-30
Attending: REGISTERED NURSE

## 2022-06-30 ENCOUNTER — HOSPITAL ENCOUNTER (OUTPATIENT)
Dept: RADIOLOGY | Facility: HOSPITAL | Age: 82
Discharge: HOME | End: 2022-06-30
Attending: INTERNAL MEDICINE

## 2022-06-30 ENCOUNTER — OFFICE VISIT (OUTPATIENT)
Dept: ORTHOPEDICS | Facility: CLINIC | Age: 82
End: 2022-06-30

## 2022-06-30 VITALS — WEIGHT: 200.63 LBS | BODY MASS INDEX: 29.71 KG/M2 | HEIGHT: 69 IN

## 2022-06-30 DIAGNOSIS — R10.10 PAIN OF UPPER ABDOMEN: ICD-10-CM

## 2022-06-30 DIAGNOSIS — M51.36 DDD (DEGENERATIVE DISC DISEASE), LUMBAR: ICD-10-CM

## 2022-06-30 DIAGNOSIS — M51.36 DDD (DEGENERATIVE DISC DISEASE), LUMBAR: Primary | ICD-10-CM

## 2022-06-30 DIAGNOSIS — M54.16 LUMBAR RADICULOPATHY: ICD-10-CM

## 2022-06-30 PROCEDURE — 99213 OFFICE O/P EST LOW 20 MIN: CPT | Mod: PBBFAC,25 | Performed by: REGISTERED NURSE

## 2022-06-30 PROCEDURE — 76700 US EXAM ABDOM COMPLETE: CPT | Mod: TC

## 2022-06-30 PROCEDURE — 99999 PR PBB SHADOW E&M-EST. PATIENT-LVL III: CPT | Mod: PBBFAC,,, | Performed by: REGISTERED NURSE

## 2022-06-30 PROCEDURE — 72110 X-RAY EXAM L-2 SPINE 4/>VWS: CPT | Mod: 26,,, | Performed by: RADIOLOGY

## 2022-06-30 PROCEDURE — 99999 PR PBB SHADOW E&M-EST. PATIENT-LVL III: ICD-10-PCS | Mod: PBBFAC,,, | Performed by: REGISTERED NURSE

## 2022-06-30 PROCEDURE — 72110 XR LUMBAR SPINE AP AND LAT WITH FLEX/EXT: ICD-10-PCS | Mod: 26,,, | Performed by: RADIOLOGY

## 2022-06-30 PROCEDURE — 76700 US ABDOMEN COMPLETE: ICD-10-PCS | Mod: 26,,, | Performed by: RADIOLOGY

## 2022-06-30 PROCEDURE — 72110 X-RAY EXAM L-2 SPINE 4/>VWS: CPT | Mod: TC

## 2022-06-30 PROCEDURE — 76700 US EXAM ABDOM COMPLETE: CPT | Mod: 26,,, | Performed by: RADIOLOGY

## 2022-06-30 PROCEDURE — 99204 OFFICE O/P NEW MOD 45 MIN: CPT | Mod: S$PBB,,, | Performed by: REGISTERED NURSE

## 2022-06-30 PROCEDURE — 99204 PR OFFICE/OUTPT VISIT, NEW, LEVL IV, 45-59 MIN: ICD-10-PCS | Mod: S$PBB,,, | Performed by: REGISTERED NURSE

## 2022-06-30 RX ORDER — GABAPENTIN 300 MG/1
300 CAPSULE ORAL NIGHTLY
Qty: 30 CAPSULE | Refills: 11 | Status: SHIPPED | OUTPATIENT
Start: 2022-06-30 | End: 2023-05-18 | Stop reason: SDUPTHER

## 2022-06-30 NOTE — PROGRESS NOTES
DATE: 6/30/2022  PATIENT: Ar Perez    Supervising Physician: Camacho Wallis M.D.    CHIEF COMPLAINT: back pain    HISTORY:  Ar Perez is a 82 y.o. male with pmhx of left sided stroke 6 years ago here for initial evaluation of low back and right leg pain (Back - 9, Leg - 4).  The pain in the low back is what bothers him most.  The pain has been present for years. The patient describes the pain as aching.  The pain is worse with walking and standing and improved by rest. There is associated numbness and tingling with weakness intermittently in his right foot since his stroke. Prior treatments have included advil and tylenol, but no PT, ESIs or surgery.  The patient denies myelopathic symptoms such as handwriting changes or difficulty with buttons/coins/keys. Denies perineal paresthesias, bowel/bladder dysfunction.    PAST MEDICAL/SURGICAL HISTORY:  Past Medical History:   Diagnosis Date    Arthritis     BPH (benign prostatic hypertrophy) with urinary obstruction     Cataract     right eye    Essential hypertension 10/16/2012    Hypertension     PONV (postoperative nausea and vomiting)     Stroke     Urinary tract infection      Past Surgical History:   Procedure Laterality Date    ADENOIDECTOMY      APPENDECTOMY      CATARACT EXTRACTION  6-    os    CATARACT EXTRACTION W/  INTRAOCULAR LENS IMPLANT Right 08/14/2017    Dr. Hooper    EPIDIDYMECTOMY      EYE SURGERY      SHOULDER ARTHROSCOPY      SHOULDER SURGERY      TONSILLECTOMY         Medications:   Current Outpatient Medications on File Prior to Visit   Medication Sig Dispense Refill    aspirin 325 MG tablet Take 325 mg by mouth once daily.      cholecalciferol, vitamin D3, 50,000 unit capsule Take by mouth once a week. 12 capsule 0    CYANOCOBALAMIN, VITAMIN B-12, (VITAMIN B-12 ORAL) Take 1 tablet by mouth once daily.      docusate sodium (COLACE) 100 MG capsule Take 1 capsule (100 mg total) by mouth 2 (two)  times daily.  0    fluorouracil (EFUDEX) 5 % cream Use hs for 2 weeks 40 g 3    fluticasone (FLONASE) 50 mcg/actuation nasal spray 1 spray by Each Nare route every evening.      ibuprofen (ADVIL,MOTRIN) 800 MG tablet Take 1 tablet (800 mg total) by mouth every 6 (six) hours as needed for Pain. 90 tablet 3    losartan (COZAAR) 50 MG tablet Take 1 tablet (50 mg total) by mouth once daily. (Patient taking differently: Take 25 mg by mouth once daily.) 15 tablet 0    multivitamin capsule Take 1 capsule by mouth once daily.      simvastatin (ZOCOR) 20 MG tablet Take 20 mg by mouth every evening.      tamsulosin (FLOMAX) 0.4 mg Cap Take by mouth once daily.      traZODone (DESYREL) 50 MG tablet Take 1 tablet (50 mg total) by mouth every evening. 100 tablet 0    UNABLE TO FIND medication name: PROBIOTIC      tadalafil (CIALIS) 5 MG tablet Take 1 tablet (5 mg total) by mouth daily as needed for Erectile Dysfunction. 30 tablet 12    trospium (SANCTURA XR) 60 mg Cp24 capsule Take 1 capsule (60 mg total) by mouth once daily. 90 capsule 3     No current facility-administered medications on file prior to visit.       Social History:   Social History     Socioeconomic History    Marital status:    Tobacco Use    Smoking status: Never Smoker    Smokeless tobacco: Never Used   Substance and Sexual Activity    Alcohol use: No     Comment: socially    Drug use: No    Sexual activity: Never       REVIEW OF SYSTEMS:  Constitution: Negative. Negative for chills, fever and night sweats.   Cardiovascular: Negative for chest pain and syncope.   Respiratory: Negative for cough and shortness of breath.   Gastrointestinal: See HPI. Negative for nausea/vomiting. Negative for abdominal pain.  Genitourinary: See HPI. Negative for discoloration or dysuria.  Skin: Negative for dry skin, itching and rash.   Hematologic/Lymphatic: Negative for bleeding problem. Does not bruise/bleed easily.   Musculoskeletal: Negative for  "falls and muscle weakness.   Neurological: See HPI. No seizures.   Endocrine: Negative for polydipsia, polyphagia and polyuria.   Allergic/Immunologic: Negative for hives and persistent infections.     EXAM:  Ht 5' 9" (1.753 m)   Wt 91 kg (200 lb 9.9 oz)   BMI 29.63 kg/m²     General: The patient is a very pleasant 82 y.o. male in no apparent distress, the patient is oriented to person, place and time.  Psych: Normal mood and affect  HEENT: Vision grossly intact, hearing intact to the spoken word.  Lungs: Respirations unlabored.  Gait: antalgicstation and gait, no difficulty with toe or heel walk.   Skin: Dorsal lumbar skin negative for rashes, lesions, hairy patches and surgical scars. There is mild lumbar tenderness to palpation.  Range of motion: Lumbar range of motion is acceptable.  Spinal Balance: Global saggital and coronal spinal balance acceptable, not significant for scoliosis and kyphosis.  Musculoskeletal: No pain with the range of motion of the bilateral hips. No trochanteric tenderness to palpation.  Vascular: Bilateral lower extremities warm and well perfused, dorsalis pedis pulses 2+ bilaterally.  Neurological: Normal strength and tone in all major motor groups in the bilateral lower extremities. Normal sensation to light touch in the L2-S1 dermatomes bilaterally.  Deep tendon reflexes symmetric 2+ in the bilateral lower extremities.  Negative Babinski bilaterally. Straight leg raise negative bilaterally.    IMAGING:      Today I personally reviewed AP, Lat and Flex/Ex  upright L-spine films that demonstrate dextroconvex curvature, moderate DDD, no instability or fractures.       Body mass index is 29.63 kg/m².    Hemoglobin A1C   Date Value Ref Range Status   06/27/2022 5.3 4.0 - 5.6 % Final     Comment:     ADA Screening Guidelines:  5.7-6.4%  Consistent with prediabetes  >or=6.5%  Consistent with diabetes    High levels of fetal hemoglobin interfere with the HbA1C  assay. Heterozygous hemoglobin " variants (HbS, HgC, etc)do  not significantly interfere with this assay.   However, presence of multiple variants may affect accuracy.     03/01/2019 5.6 4.0 - 5.6 % Final     Comment:     ADA Screening Guidelines:  5.7-6.4%  Consistent with prediabetes  >or=6.5%  Consistent with diabetes  High levels of fetal hemoglobin interfere with the HbA1C  assay. Heterozygous hemoglobin variants (HbS, HgC, etc)do  not significantly interfere with this assay.   However, presence of multiple variants may affect accuracy.     05/07/2018 5.6 4.0 - 5.6 % Final     Comment:     According to ADA guidelines, hemoglobin A1c <7.0% represents  optimal control in non-pregnant diabetic patients. Different  metrics may apply to specific patient populations.   Standards of Medical Care in Diabetes-2016.  For the purpose of screening for the presence of diabetes:  <5.7%     Consistent with the absence of diabetes  5.7-6.4%  Consistent with increasing risk for diabetes   (prediabetes)  >or=6.5%  Consistent with diabetes  Currently, no consensus exists for use of hemoglobin A1c  for diagnosis of diabetes for children.  This Hemoglobin A1c assay has significant interference with fetal   hemoglobin   (HbF). The results are invalid for patients with abnormal amounts of   HbF,   including those with known Hereditary Persistence   of Fetal Hemoglobin. Heterozygous hemoglobin variants (HbAS, HbAC,   HbAD, HbAE, HbA2) do not significantly interfere with this assay;   however, presence of multiple variants in a sample may impact the %   interference.             ASSESSMENT/PLAN:    Ar was seen today for establish care and pain.    Diagnoses and all orders for this visit:    DDD (degenerative disc disease), lumbar  -     gabapentin (NEURONTIN) 300 MG capsule; Take 1 capsule (300 mg total) by mouth every evening.    Lumbar radiculopathy  -     gabapentin (NEURONTIN) 300 MG capsule; Take 1 capsule (300 mg total) by mouth every evening.        Today  we discussed at length all of the different treatment options including anti-inflammatories, acetaminophen, rest, ice, heat, physical therapy including strengthening and stretching exercises, home exercises, ROM, aerobic conditioning, aqua therapy, other modalities including ultrasound, massage, and dry needling, epidural steroid injections and finally surgical intervention.  gabapentin sent to pharmacy. I will hold off on a MRI for now due to patient traveling home to [facility]. He may follow up as needed.

## 2022-07-08 ENCOUNTER — OFFICE VISIT (OUTPATIENT)
Dept: INTERNAL MEDICINE | Facility: CLINIC | Age: 82
End: 2022-07-08

## 2022-07-08 VITALS
WEIGHT: 197.31 LBS | BODY MASS INDEX: 28.31 KG/M2 | HEART RATE: 64 BPM | DIASTOLIC BLOOD PRESSURE: 68 MMHG | SYSTOLIC BLOOD PRESSURE: 114 MMHG

## 2022-07-08 DIAGNOSIS — N52.01 ERECTILE DYSFUNCTION DUE TO ARTERIAL INSUFFICIENCY: ICD-10-CM

## 2022-07-08 DIAGNOSIS — Z71.89 ENCOUNTER FOR MEDICATION REVIEW AND COUNSELING: Primary | ICD-10-CM

## 2022-07-08 DIAGNOSIS — R35.0 URINARY FREQUENCY: ICD-10-CM

## 2022-07-08 DIAGNOSIS — N39.41 URGE INCONTINENCE: ICD-10-CM

## 2022-07-08 DIAGNOSIS — N13.8 BPH WITH URINARY OBSTRUCTION: ICD-10-CM

## 2022-07-08 DIAGNOSIS — N40.1 BPH WITH URINARY OBSTRUCTION: ICD-10-CM

## 2022-07-08 PROCEDURE — 99999 PR PBB SHADOW E&M-EST. PATIENT-LVL II: ICD-10-PCS | Mod: PBBFAC,,, | Performed by: INTERNAL MEDICINE

## 2022-07-08 PROCEDURE — 99213 PR OFFICE/OUTPT VISIT, EST, LEVL III, 20-29 MIN: ICD-10-PCS | Mod: S$PBB,,, | Performed by: INTERNAL MEDICINE

## 2022-07-08 PROCEDURE — 99999 PR PBB SHADOW E&M-EST. PATIENT-LVL II: CPT | Mod: PBBFAC,,, | Performed by: INTERNAL MEDICINE

## 2022-07-08 PROCEDURE — 99213 OFFICE O/P EST LOW 20 MIN: CPT | Mod: S$PBB,,, | Performed by: INTERNAL MEDICINE

## 2022-07-08 PROCEDURE — 99212 OFFICE O/P EST SF 10 MIN: CPT | Mod: PBBFAC | Performed by: INTERNAL MEDICINE

## 2022-07-08 RX ORDER — TADALAFIL 5 MG/1
5 TABLET ORAL DAILY PRN
Qty: 180 TABLET | Refills: 1 | Status: SHIPPED | OUTPATIENT
Start: 2022-07-08 | End: 2023-05-18 | Stop reason: SDUPTHER

## 2022-07-08 RX ORDER — TAMSULOSIN HYDROCHLORIDE 0.4 MG/1
0.4 CAPSULE ORAL 2 TIMES DAILY
Qty: 360 CAPSULE | Refills: 1 | Status: SHIPPED | OUTPATIENT
Start: 2022-07-08

## 2022-07-08 NOTE — PROGRESS NOTES
Subjective:       Patient ID: Ar Perez is a 82 y.o. male.    Chief Complaint: Annual Exam    Mackenzie Perez is here for his annual exam. He is a very pleasant gentleman from [facility], accompanied by his wife,Esperanza. It has been about 3 years since their last visit and it is a pleasure to see them again. 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 He is very consistent with his exercises, therapy and has regained significant mobility. He has some R hip pain as well as lower back pain. I will refer him to Ortho for evaluation as requested. He also has some persistent L side chest wall pain. On exam, he has point tenderness at the costo-chondral junction in that area. He states he uses his L side more frequently since huis stroke. I will obtain an EKG at his request; reassurance provided. He also had abdominal US done in [facility] and it reported GB stones, although he denies any pain related to that. I will obtain a repeat US for evaluation. He also has some issues with LUTS, having nocturia 2-3 x. He will be seen by Urology as requested.  Review of Systems   All other systems reviewed and are negative.        Objective:      Physical Exam  Vitals and nursing note reviewed.   Constitutional:       Appearance: Normal appearance. He is normal weight.   HENT:      Head: Normocephalic and atraumatic.      Right Ear: Tympanic membrane, ear canal and external ear normal. There is no impacted cerumen.      Left Ear: Tympanic membrane, ear canal and external ear normal. There is no impacted cerumen.      Nose: Nose normal.      Mouth/Throat:      Mouth: Mucous membranes are moist.      Pharynx: Oropharynx is clear.   Eyes:      General: No scleral icterus.        Right eye: No discharge.         Left eye: No discharge.      Extraocular Movements: Extraocular movements intact.      Conjunctiva/sclera: Conjunctivae normal.       Pupils: Pupils are equal, round, and reactive to light.   Neck:      Vascular: No carotid bruit.   Cardiovascular:      Rate and Rhythm: Normal rate and regular rhythm.      Pulses: Normal pulses.      Heart sounds: Normal heart sounds. No murmur heard.  Pulmonary:      Effort: Pulmonary effort is normal. No respiratory distress.      Breath sounds: Normal breath sounds. No wheezing.   Chest:      Chest wall: No tenderness.   Abdominal:      General: Abdomen is flat. Bowel sounds are normal. There is no distension.      Palpations: Abdomen is soft. There is no mass.      Tenderness: There is no abdominal tenderness.   Musculoskeletal:         General: No tenderness. Normal range of motion.      Cervical back: Normal range of motion and neck supple. No rigidity.      Right lower leg: No edema.      Left lower leg: No edema.   Lymphadenopathy:      Cervical: No cervical adenopathy.   Skin:     General: Skin is warm and dry.      Findings: No erythema, lesion or rash.   Neurological:      General: No focal deficit present.      Mental Status: He is alert and oriented to person, place, and time.      Cranial Nerves: No cranial nerve deficit.   Psychiatric:         Mood and Affect: Mood normal.         Behavior: Behavior normal.         Thought Content: Thought content normal.         Judgment: Judgment normal.         Assessment:       Problem List Items Addressed This Visit    None     Visit Diagnoses     Routine general medical examination at a health care facility    -  Primary    Relevant Orders    EKG 12-lead (Completed)    Pain of upper abdomen        Relevant Orders    US Abdomen Complete (Completed)    Hypertension, unspecified type        Relevant Orders    EKG 12-lead (Completed)          Plan:    1. Obtain blood work and other studies as detailed above.         2. Refer to Ortho/Urology.         3. RTC for review.

## 2022-07-15 DIAGNOSIS — R60.9 SWELLING: Primary | ICD-10-CM

## 2022-07-20 ENCOUNTER — OFFICE VISIT (OUTPATIENT)
Dept: CARDIOLOGY | Facility: CLINIC | Age: 82
End: 2022-07-20

## 2022-07-20 ENCOUNTER — HOSPITAL ENCOUNTER (OUTPATIENT)
Dept: VASCULAR SURGERY | Facility: CLINIC | Age: 82
Discharge: HOME | End: 2022-07-20
Attending: INTERNAL MEDICINE

## 2022-07-20 VITALS
DIASTOLIC BLOOD PRESSURE: 61 MMHG | BODY MASS INDEX: 29.61 KG/M2 | HEIGHT: 69 IN | OXYGEN SATURATION: 97 % | HEART RATE: 65 BPM | WEIGHT: 199.94 LBS | SYSTOLIC BLOOD PRESSURE: 134 MMHG

## 2022-07-20 DIAGNOSIS — I87.2 VENOUS INSUFFICIENCY: ICD-10-CM

## 2022-07-20 DIAGNOSIS — R60.9 SWELLING: ICD-10-CM

## 2022-07-20 DIAGNOSIS — I87.2 VENOUS INSUFFICIENCY: Primary | ICD-10-CM

## 2022-07-20 PROCEDURE — 99999 PR PBB SHADOW E&M-EST. PATIENT-LVL IV: CPT | Mod: PBBFAC,,, | Performed by: STUDENT IN AN ORGANIZED HEALTH CARE EDUCATION/TRAINING PROGRAM

## 2022-07-20 PROCEDURE — 99999 PR PBB SHADOW E&M-EST. PATIENT-LVL IV: ICD-10-PCS | Mod: PBBFAC,,, | Performed by: STUDENT IN AN ORGANIZED HEALTH CARE EDUCATION/TRAINING PROGRAM

## 2022-07-20 PROCEDURE — 93971 EXTREMITY STUDY: CPT | Mod: 26,S$PBB,, | Performed by: SURGERY

## 2022-07-20 PROCEDURE — 99213 PR OFFICE/OUTPT VISIT, EST, LEVL III, 20-29 MIN: ICD-10-PCS | Mod: S$PBB,,, | Performed by: STUDENT IN AN ORGANIZED HEALTH CARE EDUCATION/TRAINING PROGRAM

## 2022-07-20 PROCEDURE — 99213 OFFICE O/P EST LOW 20 MIN: CPT | Mod: S$PBB,,, | Performed by: STUDENT IN AN ORGANIZED HEALTH CARE EDUCATION/TRAINING PROGRAM

## 2022-07-20 PROCEDURE — 93971 EXTREMITY STUDY: CPT | Mod: PBBFAC | Performed by: SURGERY

## 2022-07-20 PROCEDURE — 93971 PR US DUPLEX, UPPER OR LOWER EXT VENOUS,UNILAT OR LTD: ICD-10-PCS | Mod: 26,S$PBB,, | Performed by: SURGERY

## 2022-07-20 PROCEDURE — 99214 OFFICE O/P EST MOD 30 MIN: CPT | Mod: PBBFAC | Performed by: STUDENT IN AN ORGANIZED HEALTH CARE EDUCATION/TRAINING PROGRAM

## 2022-07-20 NOTE — PROGRESS NOTES
Mr Perez is here for his scheduled review. I gave him copies of his studies and discussed them in detail including blood work that was unremarkable as compared to his previous studies, except PSA which had increased to 3.8 from 1.3 in 2019. I reiterated the findings and recommendations of Dr Kumar, in Urology that he saw during his visit. Mr Perez had stopped Finasteride due to side effects and this action may be contributing to this rise. Mr Perez's prostate medications were adjusted by Dr Kumar and recommended he repeat PSA in 1 year; reassurance provided. The rest of his blood work was unremarkable. Additional studies included abdominal US that was unremarkable as was his EKG. Hip xray showed mild OA changes and L spine xray showed moderate DJD changes with mild DISH findings. I explained these terms to him and showed him the films for better understanding. I discussed the benefits of certain exercises that he could do to improve hi flexibility and strength. He will do. He will also continue with his current medications and RTC 1 year or sooner if needed.

## 2022-07-20 NOTE — PROGRESS NOTES
Subjective     Chief Complaint: right leg swelling    History of Present Illness:  Mr. Ar Perez is a 82 y.o. male with PMH of CVA in 2016 with minimal residual R sided hemiparesis, HTN, BPH who presents as a referral from PCP for right leg swelling. Patient reports bilateral LE swelling but R>L, has been asymmetric for the past year. Swelling is worse at the end of the day, better in the morning. He has no calf tenderness, pain or cramping. He was recently seen by PCP who referred patient here, LE US done today with no evidence of DVT but does have reflux. He has no symptoms of HF - denies KIM, orthopnea. Negative SPECT and normal EF in 2016 (see bellow).     SPECT 8/2017  Nuclear Quantitative Functional Analysis:   LVEF: 69 %     Impression: NORMAL MYOCARDIAL PERFUSION   1. The perfusion scan is free of evidence for myocardial ischemia or injury.   2. Resting wall motion is physiologic.   3. Resting LV function is normal.   4. The ventricular volumes are normal at rest and stress.   5. The extracardiac distribution of radioactivity is normal.     TTE 8/2017  CONCLUSIONS     1 - Low normal to mildly depressed left ventricular systolic function (EF 50-55%).   - no valvular abnormalities.     Review of Systems   Constitutional: Negative for fever and weight loss.   HENT: Negative for congestion and sore throat.    Eyes: Negative for blurred vision and pain.   Respiratory: Negative for cough and shortness of breath.    Cardiovascular: Positive for leg swelling. Negative for chest pain, palpitations, orthopnea, claudication and PND.   Gastrointestinal: Negative for abdominal pain, constipation, diarrhea, nausea and vomiting.   Genitourinary: Negative for dysuria and hematuria.   Neurological: Negative for weakness and headaches.       PAST HISTORY:     Past Medical History:   Diagnosis Date    Arthritis     BPH (benign prostatic hypertrophy) with urinary obstruction     Cataract     right eye     Essential hypertension 10/16/2012    Hypertension     PONV (postoperative nausea and vomiting)     Stroke     Urinary tract infection        Past Surgical History:   Procedure Laterality Date    ADENOIDECTOMY      APPENDECTOMY      CATARACT EXTRACTION  6-    os    CATARACT EXTRACTION W/  INTRAOCULAR LENS IMPLANT Right 08/14/2017    Dr. Hooper    EPIDIDYMECTOMY      EYE SURGERY      SHOULDER ARTHROSCOPY      SHOULDER SURGERY      TONSILLECTOMY         Family History   Problem Relation Age of Onset    Cancer Mother     Cataracts Mother     Heart disease Father     Heart attack Father     Cancer Brother     Amblyopia Neg Hx     Blindness Neg Hx     Glaucoma Neg Hx     Macular degeneration Neg Hx     Retinal detachment Neg Hx     Strabismus Neg Hx     Melanoma Neg Hx        Social History     Socioeconomic History    Marital status:    Tobacco Use    Smoking status: Never Smoker    Smokeless tobacco: Never Used   Substance and Sexual Activity    Alcohol use: No     Comment: socially    Drug use: No    Sexual activity: Never       MEDICATIONS & ALLERGIES:     Current Outpatient Medications on File Prior to Visit   Medication Sig    aspirin 325 MG tablet Take 325 mg by mouth once daily.    cholecalciferol, vitamin D3, 50,000 unit capsule Take by mouth once a week.    CYANOCOBALAMIN, VITAMIN B-12, (VITAMIN B-12 ORAL) Take 1 tablet by mouth once daily.    docusate sodium (COLACE) 100 MG capsule Take 1 capsule (100 mg total) by mouth 2 (two) times daily.    fluorouracil (EFUDEX) 5 % cream Use hs for 2 weeks    fluticasone (FLONASE) 50 mcg/actuation nasal spray 1 spray by Each Nare route every evening.    gabapentin (NEURONTIN) 300 MG capsule Take 1 capsule (300 mg total) by mouth every evening.    ibuprofen (ADVIL,MOTRIN) 800 MG tablet Take 1 tablet (800 mg total) by mouth every 6 (six) hours as needed for Pain.    losartan (COZAAR) 50 MG tablet Take 1 tablet (50 mg total)  "by mouth once daily. (Patient taking differently: Take 25 mg by mouth once daily.)    multivitamin capsule Take 1 capsule by mouth once daily.    simvastatin (ZOCOR) 20 MG tablet Take 20 mg by mouth every evening.    tadalafiL (CIALIS) 5 MG tablet Take 1 tablet (5 mg total) by mouth daily as needed for Erectile Dysfunction.    tamsulosin (FLOMAX) 0.4 mg Cap Take 1 capsule (0.4 mg total) by mouth 2 (two) times a day.    traZODone (DESYREL) 50 MG tablet Take 1 tablet (50 mg total) by mouth every evening.    UNABLE TO FIND medication name: PROBIOTIC    trospium (SANCTURA XR) 60 mg Cp24 capsule Take 1 capsule (60 mg total) by mouth once daily.     No current facility-administered medications on file prior to visit.       Review of patient's allergies indicates:   Allergen Reactions    Bactrim [sulfamethoxazole-trimethoprim] Rash    Ambien [zolpidem] Rash       OBJECTIVE:     Vital Signs:  Vitals:    07/20/22 1351   BP: 134/61   BP Location: Left arm   Patient Position: Sitting   BP Method: Large (Automatic)   Pulse: 65   SpO2: 97%   Weight: 90.7 kg (199 lb 15.3 oz)   Height: 5' 9" (1.753 m)       Body mass index is 29.53 kg/m².     Physical Exam  Constitutional:       Appearance: Normal appearance.   HENT:      Head: Normocephalic and atraumatic.      Mouth/Throat:      Mouth: Mucous membranes are moist.   Eyes:      Conjunctiva/sclera: Conjunctivae normal.   Cardiovascular:      Rate and Rhythm: Normal rate and regular rhythm.      Pulses: Normal pulses.   Pulmonary:      Effort: Pulmonary effort is normal. No respiratory distress.      Breath sounds: Normal breath sounds.   Abdominal:      General: Abdomen is flat. There is no distension.      Palpations: Abdomen is soft.      Tenderness: There is no abdominal tenderness. There is no guarding.   Musculoskeletal:         General: No tenderness.      Comments: Minimal to no LE edema bilaterally    Skin:     General: Skin is warm.      Capillary Refill: " Capillary refill takes less than 2 seconds.   Neurological:      General: No focal deficit present.      Mental Status: He is alert.   Psychiatric:         Mood and Affect: Mood normal.         Laboratory  Lab Results   Component Value Date    WBC 6.32 06/27/2022    HGB 13.9 (L) 06/27/2022    HCT 42.1 06/27/2022    MCV 92 06/27/2022     06/27/2022     BMP  Lab Results   Component Value Date     06/27/2022    K 4.1 06/27/2022     06/27/2022    CO2 25 06/27/2022    BUN 17 06/27/2022    CREATININE 1.2 06/27/2022    CALCIUM 9.1 06/27/2022    ANIONGAP 10 06/27/2022    ESTGFRAFRICA >60.0 06/27/2022    EGFRNONAA 55.2 (A) 06/27/2022     Lab Results   Component Value Date    INR 1.0 06/04/2016    INR 1.0 07/25/2014    INR 1.0 11/20/2009     Lab Results   Component Value Date    HGBA1C 5.3 06/27/2022     No results for input(s): POCTGLUCOSE in the last 72 hours.    Diagnostic Results:  Labs: Reviewed    Health Maintenance Due   Topic Date Due    COVID-19 Vaccine (1) Never done    TETANUS VACCINE  Never done    Shingles Vaccine (1 of 2) Never done    Pneumococcal Vaccines (Age 65+) (1 - PCV) Never done         ASSESSMENT & PLAN:   Mr. Ar Perez is a 82 y.o. male here with LE swelling    Venous insufficiency  - symptoms likely 2/2 venous insuff. No sings or symptoms of HF, LE US negative for DVT and with reflux  - educated patient about compressions socks and importance of leg elevation while sitting for prolonging periods of time      RTC in prn    Discussed with Dr. Duncan  - staff attestation to follow      Caesar Castellon MD  Cardiology Fellow PGY4

## 2022-10-06 ENCOUNTER — PATIENT MESSAGE (OUTPATIENT)
Dept: INTERNAL MEDICINE | Facility: CLINIC | Age: 82
End: 2022-10-06

## 2022-10-24 ENCOUNTER — PATIENT MESSAGE (OUTPATIENT)
Dept: INTERNAL MEDICINE | Facility: CLINIC | Age: 82
End: 2022-10-24

## 2023-05-09 DIAGNOSIS — I10 ESSENTIAL HYPERTENSION: Primary | ICD-10-CM

## 2023-05-09 DIAGNOSIS — E78.2 MIXED HYPERLIPIDEMIA: ICD-10-CM

## 2023-05-09 DIAGNOSIS — I10 HYPERTENSION, UNSPECIFIED TYPE: Primary | ICD-10-CM

## 2023-05-16 ENCOUNTER — HOSPITAL ENCOUNTER (OUTPATIENT)
Dept: CARDIOLOGY | Facility: CLINIC | Age: 83
Discharge: HOME | End: 2023-05-16

## 2023-05-16 ENCOUNTER — HOSPITAL ENCOUNTER (OUTPATIENT)
Dept: RADIOLOGY | Facility: HOSPITAL | Age: 83
Discharge: HOME | End: 2023-05-16
Attending: STUDENT IN AN ORGANIZED HEALTH CARE EDUCATION/TRAINING PROGRAM

## 2023-05-16 ENCOUNTER — OFFICE VISIT (OUTPATIENT)
Dept: INTERNAL MEDICINE | Facility: CLINIC | Age: 83
End: 2023-05-16

## 2023-05-16 VITALS
WEIGHT: 196.81 LBS | HEIGHT: 69 IN | BODY MASS INDEX: 29.15 KG/M2 | SYSTOLIC BLOOD PRESSURE: 144 MMHG | HEART RATE: 62 BPM | OXYGEN SATURATION: 98 % | DIASTOLIC BLOOD PRESSURE: 72 MMHG

## 2023-05-16 DIAGNOSIS — M54.16 LUMBAR RADICULOPATHY: ICD-10-CM

## 2023-05-16 DIAGNOSIS — N52.01 ERECTILE DYSFUNCTION DUE TO ARTERIAL INSUFFICIENCY: ICD-10-CM

## 2023-05-16 DIAGNOSIS — M51.36 DDD (DEGENERATIVE DISC DISEASE), LUMBAR: ICD-10-CM

## 2023-05-16 DIAGNOSIS — I10 HYPERTENSION, UNSPECIFIED TYPE: ICD-10-CM

## 2023-05-16 DIAGNOSIS — M54.9 DORSALGIA, UNSPECIFIED: ICD-10-CM

## 2023-05-16 DIAGNOSIS — R35.0 URINARY FREQUENCY: ICD-10-CM

## 2023-05-16 DIAGNOSIS — N40.1 BPH WITH URINARY OBSTRUCTION: ICD-10-CM

## 2023-05-16 DIAGNOSIS — Z98.890 PONV (POSTOPERATIVE NAUSEA AND VOMITING): ICD-10-CM

## 2023-05-16 DIAGNOSIS — I10 ESSENTIAL HYPERTENSION: ICD-10-CM

## 2023-05-16 DIAGNOSIS — R11.2 PONV (POSTOPERATIVE NAUSEA AND VOMITING): ICD-10-CM

## 2023-05-16 DIAGNOSIS — N39.41 URGE INCONTINENCE: ICD-10-CM

## 2023-05-16 DIAGNOSIS — N13.8 BPH WITH URINARY OBSTRUCTION: ICD-10-CM

## 2023-05-16 DIAGNOSIS — M25.551 HIP PAIN, RIGHT: ICD-10-CM

## 2023-05-16 DIAGNOSIS — Z00.00 HEALTHCARE MAINTENANCE: ICD-10-CM

## 2023-05-16 DIAGNOSIS — Z00.00 ROUTINE GENERAL MEDICAL EXAMINATION AT A HEALTH CARE FACILITY: Primary | ICD-10-CM

## 2023-05-16 PROBLEM — R79.89 ELEVATED TROPONIN: Status: RESOLVED | Noted: 2017-08-21 | Resolved: 2023-05-16

## 2023-05-16 PROCEDURE — 71046 XR CHEST PA AND LATERAL: ICD-10-PCS | Mod: 26,,, | Performed by: RADIOLOGY

## 2023-05-16 PROCEDURE — 93010 ELECTROCARDIOGRAM REPORT: CPT | Mod: S$PBB,,, | Performed by: INTERNAL MEDICINE

## 2023-05-16 PROCEDURE — 99999 PR PBB SHADOW E&M-EST. PATIENT-LVL V: ICD-10-PCS | Mod: PBBFAC,,, | Performed by: STUDENT IN AN ORGANIZED HEALTH CARE EDUCATION/TRAINING PROGRAM

## 2023-05-16 PROCEDURE — 93005 ELECTROCARDIOGRAM TRACING: CPT | Mod: PBBFAC | Performed by: INTERNAL MEDICINE

## 2023-05-16 PROCEDURE — 71046 X-RAY EXAM CHEST 2 VIEWS: CPT | Mod: 26,,, | Performed by: RADIOLOGY

## 2023-05-16 PROCEDURE — 99999 PR PBB SHADOW E&M-EST. PATIENT-LVL V: CPT | Mod: PBBFAC,,, | Performed by: STUDENT IN AN ORGANIZED HEALTH CARE EDUCATION/TRAINING PROGRAM

## 2023-05-16 PROCEDURE — 99397 PER PM REEVAL EST PAT 65+ YR: CPT | Mod: S$PBB,,, | Performed by: STUDENT IN AN ORGANIZED HEALTH CARE EDUCATION/TRAINING PROGRAM

## 2023-05-16 PROCEDURE — 99397 PR PREVENTIVE VISIT,EST,65 & OVER: ICD-10-PCS | Mod: S$PBB,,, | Performed by: STUDENT IN AN ORGANIZED HEALTH CARE EDUCATION/TRAINING PROGRAM

## 2023-05-16 PROCEDURE — 71046 X-RAY EXAM CHEST 2 VIEWS: CPT | Mod: TC,FY

## 2023-05-16 PROCEDURE — 93010 EKG 12-LEAD: ICD-10-PCS | Mod: S$PBB,,, | Performed by: INTERNAL MEDICINE

## 2023-05-16 PROCEDURE — 99215 OFFICE O/P EST HI 40 MIN: CPT | Mod: PBBFAC,25 | Performed by: STUDENT IN AN ORGANIZED HEALTH CARE EDUCATION/TRAINING PROGRAM

## 2023-05-16 RX ORDER — ASPIRIN 81 MG/1
81 TABLET ORAL DAILY
COMMUNITY

## 2023-05-16 RX ORDER — LOSARTAN POTASSIUM 25 MG/1
25 TABLET ORAL DAILY
Qty: 90 TABLET | Refills: 3
Start: 2023-05-16 | End: 2024-05-15

## 2023-05-16 NOTE — ASSESSMENT & PLAN NOTE
Health care maintenance and core gaps reviewed and assessed with patient.  Vaccinations recommended:        Tetanus - O       Shingles - O       Influenza - N/A       Pneumonia - O  Colon cancer screening:   Colonoscopy: N/A  Lifestyle recommendations given.  Physical activity recommended.    Legend: Ordered (O), Declined (D), Current (C)

## 2023-05-16 NOTE — ASSESSMENT & PLAN NOTE
Controlled.  Will continue same management:  Losartan 25 mg qd  Recommended to monitor blood pressure regularly, eat a well-balanced diet that's low in salt, DASH diet, enjoy regular physical activity, manage stress, maintain a healthy weight, take medications properly.

## 2023-05-16 NOTE — PROGRESS NOTES
Subjective:       Patient ID: Ar Perez is a 83 y.o. male.    Chief Complaint: Annual Exam    HPI    Ar Perez is a 83 y.o. male , English speaking, with a history of:  HTN  HLD  H/O CVA with right sided residual hemiparesis (mild)  BPH  ED  H/O Rotator cuff repair  Cataracts  Insomnia on trazodone  Overactive bladder    Patient comes to the clinic a general medical health examination    Patient is currently asymptomatic and has no complaints.  Patient denies CV symptoms, CP, SOB, palpitations.  Patient denies constitutional symptoms, fever, changes in the urine or stool.     Patient states his back and hip pain have gotten worse. He reports his R hip pain, is moderate, worse at the end of the day. Pain starts in the lumbar spine, this is chronic, has gotten worse since last year.   Patient controls the pain with ibuprofen as needed, with relief.  He does PT twice a week  Patient denies weight loss.    Today's labs:  CBC WNL  CMP WNL Cr. 1.1 GFR 55.2  Lipid panel: , HDL 38, LDL 61, TG 69  A1c: 5.4  TSH WNL 0.931  PSA 6.8  ECG 1st degree AV block    Working out daily  PT twice a week      PMH:  Past Medical History:   Diagnosis Date    Arthritis     BPH (benign prostatic hypertrophy) with urinary obstruction     Cataract     right eye    Essential hypertension 10/16/2012    Hypertension     PONV (postoperative nausea and vomiting)     Stroke     Urinary tract infection        PSH:  Past Surgical History:   Procedure Laterality Date    ADENOIDECTOMY      APPENDECTOMY      CATARACT EXTRACTION  6-    os    CATARACT EXTRACTION W/  INTRAOCULAR LENS IMPLANT Right 08/14/2017    Dr. Hooper    EPIDIDYMECTOMY      EYE SURGERY      SHOULDER ARTHROSCOPY      SHOULDER SURGERY      TONSILLECTOMY         FH:  Family History   Problem Relation Age of Onset    Cancer Mother     Cataracts Mother     Heart disease Father     Heart attack Father     Cancer Brother     Amblyopia Neg Hx      Blindness Neg Hx     Glaucoma Neg Hx     Macular degeneration Neg Hx     Retinal detachment Neg Hx     Strabismus Neg Hx     Melanoma Neg Hx        Allergies: Negative  Review of patient's allergies indicates:   Allergen Reactions    Bactrim [sulfamethoxazole-trimethoprim] Rash    Ambien [zolpidem] Rash       Meds:    Current Outpatient Medications:     aspirin (ECOTRIN) 81 MG EC tablet, Take 81 mg by mouth once daily., Disp: , Rfl:     aspirin 325 MG tablet, Take 325 mg by mouth twice a week. On weekends only, Disp: , Rfl:     cholecalciferol, vitamin D3, 50,000 unit capsule, Take by mouth once a week., Disp: 12 capsule, Rfl: 0    CYANOCOBALAMIN, VITAMIN B-12, (VITAMIN B-12 ORAL), Take 1 tablet by mouth once daily., Disp: , Rfl:     docusate sodium (COLACE) 100 MG capsule, Take 1 capsule (100 mg total) by mouth 2 (two) times daily., Disp: , Rfl: 0    fluorouracil (EFUDEX) 5 % cream, Use hs for 2 weeks, Disp: 40 g, Rfl: 3    fluticasone (FLONASE) 50 mcg/actuation nasal spray, 1 spray by Each Nare route every evening., Disp: , Rfl:     gabapentin (NEURONTIN) 300 MG capsule, Take 1 capsule (300 mg total) by mouth every evening., Disp: 30 capsule, Rfl: 11    ibuprofen (ADVIL,MOTRIN) 800 MG tablet, Take 1 tablet (800 mg total) by mouth every 6 (six) hours as needed for Pain., Disp: 90 tablet, Rfl: 3    multivitamin capsule, Take 1 capsule by mouth once daily., Disp: , Rfl:     simvastatin (ZOCOR) 20 MG tablet, Take 20 mg by mouth every evening., Disp: , Rfl:     tamsulosin (FLOMAX) 0.4 mg Cap, Take 1 capsule (0.4 mg total) by mouth 2 (two) times a day., Disp: 360 capsule, Rfl: 1    traZODone (DESYREL) 50 MG tablet, Take 1 tablet (50 mg total) by mouth every evening., Disp: 100 tablet, Rfl: 0    UNABLE TO FIND, medication name: PROBIOTIC, Disp: , Rfl:     losartan (COZAAR) 25 MG tablet, Take 1 tablet (25 mg total) by mouth once daily., Disp: 90 tablet, Rfl: 3    tadalafiL (CIALIS) 5 MG tablet, Take 1 tablet (5 mg total)  by mouth daily as needed for Erectile Dysfunction., Disp: 180 tablet, Rfl: 1    Social:    Exercise: Sedentary  Diet: Regular with no restrictions  Tobacco: Denies  Alcohol: Denies  Recreational drug use: Denies  Recent travel: Denies    Healthcare Maintenance:  Colonoscopy:  Vaccinations: Pneumonia, Zoster, Tetanus  COVID vaccination: completed  Depression screening: PHQ2 score = 0    Annual visit approx. Month:     ROS  11-point review of systems done. Negative except for detailed in the HPI.        Objective:          Physical Exam  Vitals and nursing note reviewed.   Constitutional:       Appearance: Normal appearance.   HENT:      Head: Normocephalic and atraumatic.      Comments: Very mild right sided facial droop, this is chronic, unchanged, stable since stroke     Right Ear: Tympanic membrane normal.      Left Ear: Tympanic membrane normal.      Nose: Nose normal.      Mouth/Throat:      Mouth: Mucous membranes are moist.      Pharynx: Oropharynx is clear.   Eyes:      Extraocular Movements: Extraocular movements intact.      Conjunctiva/sclera: Conjunctivae normal.      Pupils: Pupils are equal, round, and reactive to light.   Cardiovascular:      Rate and Rhythm: Normal rate and regular rhythm.      Pulses: Normal pulses.      Heart sounds: Normal heart sounds.   Pulmonary:      Effort: Pulmonary effort is normal.      Breath sounds: Normal breath sounds.   Abdominal:      General: Bowel sounds are normal. There is no distension.      Palpations: Abdomen is soft.      Tenderness: There is no abdominal tenderness.   Musculoskeletal:         General: Normal range of motion.      Cervical back: Normal range of motion and neck supple.   Skin:     General: Skin is warm.   Neurological:      General: No focal deficit present.      Mental Status: He is alert and oriented to person, place, and time. Mental status is at baseline.      Cranial Nerves: No cranial nerve deficit.      Sensory: No sensory deficit.       Motor: No weakness.      Coordination: Coordination normal.      Gait: Gait normal.      Deep Tendon Reflexes: Reflexes normal.      Comments: Very mild R sided weakness 4/5 upper and lower extremities.   Psychiatric:         Mood and Affect: Mood normal.          Assessment:       1. Routine general medical examination at a health care facility  Assessment & Plan:  Patient is in overall good general health.  Stable medical conditions listed on the PMH.  Labs reviewed and patient notified.        2. Essential hypertension  Assessment & Plan:  Controlled.  Will continue same management:  Losartan 25 mg qd  Recommended to monitor blood pressure regularly, eat a well-balanced diet that's low in salt, DASH diet, enjoy regular physical activity, manage stress, maintain a healthy weight, take medications properly.      Orders:  -     losartan (COZAAR) 25 MG tablet; Take 1 tablet (25 mg total) by mouth once daily.  Dispense: 90 tablet; Refill: 3    3. Hip pain, right  Assessment & Plan:  H/o OA lumbar spine and hips  Pain is getting worse  Does PT twice a week in [facility]  Pain is managed with Advil    Orders:  -     MRI Hip Without Contrast Right; Future; Expected date: 05/16/2023    4. PONV (postoperative nausea and vomiting)    5. Dorsalgia, unspecified  Assessment & Plan:  MRI lumbar spine and hip ordered    Orders:  -     MRI Lumbar Spine W WO Contrast; Future; Expected date: 05/16/2023    6. Lumbar radiculopathy  Assessment & Plan:  Worsening lumbar and right hip pain  XR from 2022 showing OA and DDD  Will obtain MRI    Orders:  -     MRI Lumbar Spine W WO Contrast; Future; Expected date: 05/16/2023    7. Healthcare maintenance  Assessment & Plan:  Health care maintenance and core gaps reviewed and assessed with patient.  Vaccinations recommended:        Tetanus - O       Shingles - O       Influenza - N/A       Pneumonia - O  Colon cancer screening:   Colonoscopy: N/A  Lifestyle recommendations given.  Physical  activity recommended.    Legend: Ordered (O), Declined (D), Current (C)      Orders:  -     Tdap Vaccine; Future  -     Zoster Recombinant Vaccine; Standing  -     Pneumococcal Polysaccharide Vaccine (23 Valent) (SQ/IM)       Plan:       MRI lumbar and hip ordered  Vaccinations ordered: Tetanus, pneumonia, Shingrix    Education provided  Lifestyle recommendations given  AVS generated, explained, and sent to the patient through the patient portal.  RTC in : 1 week for review        MARY KHAN MD, MPH  Internal Medicine  [facility]  [facility]

## 2023-05-16 NOTE — ASSESSMENT & PLAN NOTE
H/o OA lumbar spine and hips  Pain is getting worse  Does PT twice a week in [place]  Pain is managed with Advil

## 2023-05-17 ENCOUNTER — PROCEDURE VISIT (OUTPATIENT)
Dept: OPHTHALMOLOGY | Facility: CLINIC | Age: 83
End: 2023-05-17

## 2023-05-17 ENCOUNTER — OFFICE VISIT (OUTPATIENT)
Dept: UROLOGY | Facility: CLINIC | Age: 83
End: 2023-05-17

## 2023-05-17 VITALS
HEIGHT: 69 IN | DIASTOLIC BLOOD PRESSURE: 58 MMHG | HEART RATE: 76 BPM | WEIGHT: 194 LBS | BODY MASS INDEX: 28.73 KG/M2 | SYSTOLIC BLOOD PRESSURE: 111 MMHG

## 2023-05-17 DIAGNOSIS — R97.20 ELEVATED PSA: Primary | ICD-10-CM

## 2023-05-17 DIAGNOSIS — H26.493 POSTERIOR CAPSULAR OPACIFICATION VISUALLY SIGNIFICANT OF BOTH EYES: Primary | ICD-10-CM

## 2023-05-17 PROCEDURE — 99214 OFFICE O/P EST MOD 30 MIN: CPT | Mod: S$PBB,,, | Performed by: UROLOGY

## 2023-05-17 PROCEDURE — 66821 AFTER CATARACT LASER SURGERY: CPT | Mod: PBBFAC,RT | Performed by: OPHTHALMOLOGY

## 2023-05-17 PROCEDURE — 66821 PR DISCISSION,2ND CATARACT,LASER: ICD-10-PCS | Mod: S$PBB,RT,, | Performed by: OPHTHALMOLOGY

## 2023-05-17 PROCEDURE — 66821 AFTER CATARACT LASER SURGERY: CPT | Mod: S$PBB,RT,, | Performed by: OPHTHALMOLOGY

## 2023-05-17 PROCEDURE — 92014 COMPRE OPH EXAM EST PT 1/>: CPT | Mod: 57,S$PBB,, | Performed by: OPHTHALMOLOGY

## 2023-05-17 PROCEDURE — 92014 PR EYE EXAM, EST PATIENT,COMPREHESV: ICD-10-PCS | Mod: 57,S$PBB,, | Performed by: OPHTHALMOLOGY

## 2023-05-17 PROCEDURE — 99214 OFFICE O/P EST MOD 30 MIN: CPT | Mod: PBBFAC,25 | Performed by: UROLOGY

## 2023-05-17 PROCEDURE — 99999 PR PBB SHADOW E&M-EST. PATIENT-LVL IV: ICD-10-PCS | Mod: PBBFAC,,, | Performed by: UROLOGY

## 2023-05-17 PROCEDURE — 99214 PR OFFICE/OUTPT VISIT, EST, LEVL IV, 30-39 MIN: ICD-10-PCS | Mod: S$PBB,,, | Performed by: UROLOGY

## 2023-05-17 PROCEDURE — 99999 PR PBB SHADOW E&M-EST. PATIENT-LVL IV: CPT | Mod: PBBFAC,,, | Performed by: UROLOGY

## 2023-05-17 NOTE — PROGRESS NOTES
HPI    Patient presents today for a YAG  Evaluation. Patient notes vision as   blurry. Patient had surgery August 2017.  Patient notes difficulty with glare. Patient notes no eye pain.    Last edited by Tim Griffith on 5/17/2023  1:40 PM.            Assessment /Plan     For exam results, see Encounter Report.    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  Discussed risks, benefits, and alternatives to laser surgery.  YAG laser capsulotomy Procedure Note:   Informed consent obtained and correct eye(s) verified with patient.  1 drop of topical Proparacaine and Iopidine instilled, and eye(s) dilated with 1% Tropicamide 2.5% Phenylephrine.  YAG laser applied to posterior capsule in cruciate pattern OD  Patient tolerated procedure well. No complications. Follow up in 1 month/PRN.

## 2023-05-17 NOTE — PROGRESS NOTES
Subjective:       Patient ID: Ar Perez is a 83 y.o. male.    Chief Complaint: annual exam    HPI patient is here with a PSA of 6.8.  He is 83 years old.  No back pain bone pain weight loss.  Negative history of prostate cancer.  We discussed the AUA recommendations for ordering PSAs in men in their 70s and 80s etcetera    Past Medical History:   Diagnosis Date    Arthritis     BPH (benign prostatic hypertrophy) with urinary obstruction     Cataract     right eye    Essential hypertension 10/16/2012    Hypertension     PONV (postoperative nausea and vomiting)     Stroke     Urinary tract infection        Past Surgical History:   Procedure Laterality Date    ADENOIDECTOMY      APPENDECTOMY      CATARACT EXTRACTION  6-    os    CATARACT EXTRACTION W/  INTRAOCULAR LENS IMPLANT Right 08/14/2017    Dr. Hooper    EPIDIDYMECTOMY      EYE SURGERY      SHOULDER ARTHROSCOPY      SHOULDER SURGERY      TONSILLECTOMY         Family History   Problem Relation Age of Onset    Cancer Mother     Cataracts Mother     Heart disease Father     Heart attack Father     Cancer Brother     Amblyopia Neg Hx     Blindness Neg Hx     Glaucoma Neg Hx     Macular degeneration Neg Hx     Retinal detachment Neg Hx     Strabismus Neg Hx     Melanoma Neg Hx        Social History     Socioeconomic History    Marital status:    Tobacco Use    Smoking status: Never    Smokeless tobacco: Never   Substance and Sexual Activity    Alcohol use: No     Comment: socially    Drug use: No    Sexual activity: Never       Allergies:  Bactrim [sulfamethoxazole-trimethoprim] and Ambien [zolpidem]    Medications:    Current Outpatient Medications:     aspirin (ECOTRIN) 81 MG EC tablet, Take 81 mg by mouth once daily., Disp: , Rfl:     aspirin 325 MG tablet, Take 325 mg by mouth twice a week. On weekends only, Disp: , Rfl:     cholecalciferol, vitamin D3, 50,000 unit capsule, Take by mouth once a week., Disp: 12 capsule, Rfl: 0     CYANOCOBALAMIN, VITAMIN B-12, (VITAMIN B-12 ORAL), Take 1 tablet by mouth once daily., Disp: , Rfl:     docusate sodium (COLACE) 100 MG capsule, Take 1 capsule (100 mg total) by mouth 2 (two) times daily., Disp: , Rfl: 0    fluorouracil (EFUDEX) 5 % cream, Use hs for 2 weeks, Disp: 40 g, Rfl: 3    fluticasone (FLONASE) 50 mcg/actuation nasal spray, 1 spray by Each Nare route every evening., Disp: , Rfl:     gabapentin (NEURONTIN) 300 MG capsule, Take 1 capsule (300 mg total) by mouth every evening., Disp: 30 capsule, Rfl: 11    ibuprofen (ADVIL,MOTRIN) 800 MG tablet, Take 1 tablet (800 mg total) by mouth every 6 (six) hours as needed for Pain., Disp: 90 tablet, Rfl: 3    losartan (COZAAR) 25 MG tablet, Take 1 tablet (25 mg total) by mouth once daily., Disp: 90 tablet, Rfl: 3    multivitamin capsule, Take 1 capsule by mouth once daily., Disp: , Rfl:     simvastatin (ZOCOR) 20 MG tablet, Take 20 mg by mouth every evening., Disp: , Rfl:     tamsulosin (FLOMAX) 0.4 mg Cap, Take 1 capsule (0.4 mg total) by mouth 2 (two) times a day., Disp: 360 capsule, Rfl: 1    traZODone (DESYREL) 50 MG tablet, Take 1 tablet (50 mg total) by mouth every evening., Disp: 100 tablet, Rfl: 0    UNABLE TO FIND, medication name: PROBIOTIC, Disp: , Rfl:     tadalafiL (CIALIS) 5 MG tablet, Take 1 tablet (5 mg total) by mouth daily as needed for Erectile Dysfunction., Disp: 180 tablet, Rfl: 1    Review of Systems   Constitutional:  Negative for activity change, appetite change, chills, diaphoresis, fatigue, fever and unexpected weight change.   HENT:  Negative for congestion, dental problem, hearing loss, mouth sores, postnasal drip, rhinorrhea, sinus pressure and trouble swallowing.    Eyes:  Negative for pain, discharge and itching.   Respiratory:  Negative for apnea, cough, choking, chest tightness, shortness of breath and wheezing.    Cardiovascular:  Negative for chest pain, palpitations and leg swelling.   Gastrointestinal:  Negative for  abdominal distention, abdominal pain, anal bleeding, blood in stool, constipation, diarrhea, nausea, rectal pain and vomiting.   Endocrine: Negative for polydipsia and polyuria.   Genitourinary:  Negative for decreased urine volume, difficulty urinating, dysuria, enuresis, flank pain, frequency, genital sores, hematuria, penile discharge, penile pain, penile swelling, scrotal swelling, testicular pain and urgency.   Musculoskeletal:  Negative for arthralgias, back pain and myalgias.   Skin:  Negative for color change, rash and wound.   Neurological:  Negative for dizziness, syncope, speech difficulty, light-headedness and headaches.   Hematological:  Negative for adenopathy. Does not bruise/bleed easily.   Psychiatric/Behavioral:  Negative for behavioral problems, confusion, hallucinations and sleep disturbance.      Objective:      Physical Exam  Constitutional:       Appearance: He is well-developed.   HENT:      Head: Normocephalic.   Cardiovascular:      Rate and Rhythm: Normal rate.   Pulmonary:      Effort: Pulmonary effort is normal.   Abdominal:      Palpations: Abdomen is soft.   Genitourinary:     Prostate: Normal.      Comments: 2530 g smooth no nodules  Skin:     General: Skin is warm.   Neurological:      Mental Status: He is alert.       Assessment:       1. Elevated PSA        Plan:       Ar was seen today for annual exam.    Diagnoses and all orders for this visit:    Elevated PSA        Return to clinic 1 year for ITZEL.  No PSA is necessary unless patient develops a nodule or any unexplained problems

## 2023-05-18 RX ORDER — TADALAFIL 5 MG/1
5 TABLET ORAL DAILY PRN
Qty: 180 TABLET | Refills: 1 | Status: SHIPPED | OUTPATIENT
Start: 2023-05-18 | End: 2024-05-17

## 2023-05-18 RX ORDER — GABAPENTIN 300 MG/1
300 CAPSULE ORAL NIGHTLY
Qty: 180 CAPSULE | Refills: 1 | Status: SHIPPED | OUTPATIENT
Start: 2023-05-18 | End: 2024-05-17

## 2023-05-19 ENCOUNTER — CLINICAL SUPPORT (OUTPATIENT)
Dept: INFECTIOUS DISEASES | Facility: CLINIC | Age: 83
End: 2023-05-19

## 2023-05-19 ENCOUNTER — HOSPITAL ENCOUNTER (OUTPATIENT)
Dept: RADIOLOGY | Facility: HOSPITAL | Age: 83
Discharge: HOME | End: 2023-05-19
Attending: STUDENT IN AN ORGANIZED HEALTH CARE EDUCATION/TRAINING PROGRAM

## 2023-05-19 ENCOUNTER — OFFICE VISIT (OUTPATIENT)
Dept: INTERNAL MEDICINE | Facility: CLINIC | Age: 83
End: 2023-05-19

## 2023-05-19 VITALS
SYSTOLIC BLOOD PRESSURE: 120 MMHG | WEIGHT: 198.5 LBS | HEART RATE: 76 BPM | DIASTOLIC BLOOD PRESSURE: 70 MMHG | HEIGHT: 69 IN | BODY MASS INDEX: 29.4 KG/M2

## 2023-05-19 DIAGNOSIS — M54.16 LUMBAR RADICULOPATHY: ICD-10-CM

## 2023-05-19 DIAGNOSIS — M54.9 DORSALGIA, UNSPECIFIED: ICD-10-CM

## 2023-05-19 DIAGNOSIS — Z00.00 ROUTINE GENERAL MEDICAL EXAMINATION AT A HEALTH CARE FACILITY: ICD-10-CM

## 2023-05-19 DIAGNOSIS — Z71.89 ENCOUNTER FOR MEDICATION REVIEW AND COUNSELING: Primary | ICD-10-CM

## 2023-05-19 DIAGNOSIS — M25.551 HIP PAIN, RIGHT: ICD-10-CM

## 2023-05-19 DIAGNOSIS — Z00.00 HEALTHCARE MAINTENANCE: ICD-10-CM

## 2023-05-19 PROCEDURE — 99213 OFFICE O/P EST LOW 20 MIN: CPT | Mod: S$PBB,,, | Performed by: STUDENT IN AN ORGANIZED HEALTH CARE EDUCATION/TRAINING PROGRAM

## 2023-05-19 PROCEDURE — 72148 MRI LUMBAR SPINE WITHOUT CONTRAST: ICD-10-PCS | Mod: 26,,, | Performed by: RADIOLOGY

## 2023-05-19 PROCEDURE — 99213 PR OFFICE/OUTPT VISIT, EST, LEVL III, 20-29 MIN: ICD-10-PCS | Mod: S$PBB,,, | Performed by: STUDENT IN AN ORGANIZED HEALTH CARE EDUCATION/TRAINING PROGRAM

## 2023-05-19 PROCEDURE — 90471 IMMUNIZATION ADMIN: CPT | Mod: PBBFAC

## 2023-05-19 PROCEDURE — 90732 PPSV23 VACC 2 YRS+ SUBQ/IM: CPT | Mod: PBBFAC

## 2023-05-19 PROCEDURE — 99213 OFFICE O/P EST LOW 20 MIN: CPT | Mod: PBBFAC,25 | Performed by: STUDENT IN AN ORGANIZED HEALTH CARE EDUCATION/TRAINING PROGRAM

## 2023-05-19 PROCEDURE — 99999 PR PBB SHADOW E&M-EST. PATIENT-LVL III: ICD-10-PCS | Mod: PBBFAC,,, | Performed by: STUDENT IN AN ORGANIZED HEALTH CARE EDUCATION/TRAINING PROGRAM

## 2023-05-19 PROCEDURE — 72148 MRI LUMBAR SPINE W/O DYE: CPT | Mod: 26,,, | Performed by: RADIOLOGY

## 2023-05-19 PROCEDURE — 73721 MRI JNT OF LWR EXTRE W/O DYE: CPT | Mod: 26,RT,, | Performed by: RADIOLOGY

## 2023-05-19 PROCEDURE — 73721 MRI HIP WITHOUT CONTRAST RIGHT: ICD-10-PCS | Mod: 26,RT,, | Performed by: RADIOLOGY

## 2023-05-19 PROCEDURE — 99999 PR PBB SHADOW E&M-EST. PATIENT-LVL III: CPT | Mod: PBBFAC,,, | Performed by: STUDENT IN AN ORGANIZED HEALTH CARE EDUCATION/TRAINING PROGRAM

## 2023-05-19 PROCEDURE — 72148 MRI LUMBAR SPINE W/O DYE: CPT | Mod: TC

## 2023-05-19 PROCEDURE — 73721 MRI JNT OF LWR EXTRE W/O DYE: CPT | Mod: TC,RT

## 2023-05-19 NOTE — PROGRESS NOTES
Patient received the Pneumovax vaccines in the left deltoid. Pt tolerated well. Pt asked to wait in the clinic 15 minutes after injection in the event of an allergic reaction. Pt verbalized understanding. Pt left in NAD.

## 2023-05-19 NOTE — PROGRESS NOTES
Subjective:       Patient ID: Ar Perez is a 83 y.o. male.    Chief Complaint: No chief complaint on file.    HPI    Ar Perez is a 83 y.o. 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    Urology: Continue yearly f/u with physical exam, no need fo PSA.    MRI Lumbar Spine Without Contrast  Narrative: EXAMINATION:  MRI LUMBAR SPINE WITHOUT CONTRAST    CLINICAL HISTORY:  Low back pain, symptoms persist with > 6wks conservative treatment;Lumbar radiculopathy, no red flags, no prior management; Dorsalgia, unspecified    TECHNIQUE:  Multiplanar, multisequence MR images were acquired from the thoracolumbar junction to the sacrum without the administration of contrast.    COMPARISON:  Lumbar spine radiograph 06/30/2022    FINDINGS:  Spinal alignment and vertebral body heights are maintained.  No acute fracture or marrow infiltrative process.  Multilevel intervertebral disc desiccation and moderate disc height loss at L2-L3 and L5-S1.  Schmorl's node at the superior endplate of L3.    Conus is normal in appearance and terminates at L1.  Visualized intra-abdominal viscera are unremarkable.  Mild paraspinal muscular atrophy.    T12-L1: No significant foraminal narrowing or spinal canal stenosis.    L1-L2: No significant foraminal narrowing or spinal canal stenosis.    L2-L3: Circumferential disc bulge, moderate facet arthropathy, and ligamentum flavum thickening contributing to mild left foraminal narrowing and severe spinal canal stenosis.    L3-L4: Circumferential disc bulge, moderate facet arthropathy, and ligamentum flavum thickening contributing to mild bilateral foraminal narrowing and mild spinal canal stenosis.    L4-L5: Circumferential disc bulge,  severe facet arthropathy, and ligamentum flavum thickening contributing to mild bilateral foraminal narrowing and moderate spinal canal stenosis.    L5-S1: Circumferential disc bulge, severe facet arthropathy and ligamentum flavum thickening contribute to moderate spinal canal stenosis with severe effacement of the right lateral recess as well as moderate right, mild left neural foraminal narrowing.  There is compression of the descending right S1 nerve root.  Impression: 1. Multilevel degenerative changes of the lumbar spine detailed above.  Severe spinal canal stenosis noted at L2-L3.  Severe narrowing of the right lateral recess and moderate narrowing of the right neural foramen noted at L5-S1.    Electronically signed by resident: Miki Coker MD  Date:    05/19/2023  Time:    12:59    Electronically signed by: Jewel Gómez MD  Date:    05/19/2023  Time:    14:52  MRI Hip Without Contrast Right  Narrative: EXAMINATION:  MRI HIP WITHOUT CONTRAST RIGHT    CLINICAL HISTORY:  Hip pain, chronic, articular cartilage eval, xray done;  Pain in right hip    TECHNIQUE:  MRI of the right hip performed without contrast.    COMPARISON:  Radiographs 06/28/2022    FINDINGS:  Bone marrow signal is maintained.  No fracture or marrow infiltrative process.    Right hip exhibits fraying of the acetabular labrum with full-thickness cartilage loss over the superior acetabulum and femoral head.  Trace joint effusion and osteophyte production noted.  Ligamentum teres appears attenuated.    Large field-of-view of the left hip exhibits fraying of the acetabular labrum with thinning of articular cartilage, osteophyte production and trace joint effusion.    There is left gluteus minimus tendinosis with thin peritrochanteric fluid.  Remaining regional muscles and tendons are unremarkable.  No evidence for iliopsoas bursitis.    No evidence for ischiofemoral impingement.  Piriformis muscles are symmetric.    There are degenerative  "changes of the lower lumbar spine.  Sacroiliac joints and pubic symphysis are unremarkable.    Note made of enlarged prostate with benign prostatic hyperplasia.  There are bilateral fat containing inguinal hernias.  Impression: 1. Fraying of the right hip acetabular labrum with full-thickness cartilage loss, osteophyte production and trace joint effusion.  2. Large field-of-view of the left hip exhibits fraying of the acetabular labrum with thinning of articular cartilage, osteophyte production and trace joint effusion.  3. Left gluteus minimus tendinosis with greater trochanteric bursitis.  4. Degenerative changes of the lower lumbar spine.  5. Benign prostatic hyperplasia.  6. Bilateral fat containing inguinal hernias.    Electronically signed by: Jewel Gómez MD  Date:    05/19/2023  Time:    13:09    ECG NSR, 1st degree AV block    Patient doesn't want to do surgery, he would like to continue with physical therapy and pain management.    Will plan for pain management clinic assessment in his next visit.    Labs reviewed with patient.  Questions answered.      Healthcare Maintenance:  Colonoscopy: N/A  Vaccinations: Pneumonia 2023, Zoster 2006, Tetanus (no)  COVID vaccination: completed x 1  Depression screening: PHQ2 score = 0    Annual Wellness visit approx. Month: May    ROS  11-point review of systems done. Negative except for detailed in the HPI.        Objective:      Vitals:    05/19/23 1504   BP: 120/70   Pulse: 76   Weight: 90 kg (198 lb 8 oz)   Height: 5' 9" (1.753 m)     General appearance: Good general aspect, NAD, conversant   Eyes and HEENT: Normal sclerae  Respiratory: No work of breathing  Abdomen and : Soft, nondistended  Extremities: no edemas  Nervous System: Alert and oriented x 3  Psych: Appropriate affect, alert and oriented to person, place and time        Assessment:       1. Encounter for medication review and counseling  Assessment & Plan:  Test results, images and recommendations by " other specialties reviewed and discussed with the patient. Questions answered.  Conservative management for lumbar radiculopathy.      2. Routine general medical examination at a health care facility  -     CBC Auto Differential; Future; Expected date: 05/08/2024  -     Comprehensive Metabolic Panel; Future; Expected date: 05/08/2024  -     Hemoglobin A1C; Future; Expected date: 05/08/2024  -     Lipid Panel; Future; Expected date: 05/08/2024  -     TSH; Future; Expected date: 05/08/2024  -     Urinalysis; Future; Expected date: 05/08/2024    3. Lumbar radiculopathy  Assessment & Plan:  Severe, as seen on MRI  Patient is not interested in surgery  Will plan for appointment with pain management.      4. Healthcare maintenance  Assessment & Plan:  Health care maintenance and core gaps reviewed and assessed with patient.  Vaccinations recommended:        Tetanus - O       Shingles - O       Influenza - N/A       Pneumonia - 2023  Colon cancer screening:   Colonoscopy: N/A  Lifestyle recommendations given.  Physical activity recommended.    Legend: Ordered (O), Declined (D), Current (C)           Plan:       Plan to see pain management clinic during next visit  Continue physical therapy.    Education provided  Lifestyle recommendations given  AVS printed, explained, and given to the patient.  RTC in : 1 year for annual wellness visit, labs ordered              MARY KHAN MD, MPH  Internal Medicine  [facility]  [facility]

## 2023-05-19 NOTE — ASSESSMENT & PLAN NOTE
Test results, images and recommendations by other specialties reviewed and discussed with the patient. Questions answered.  Conservative management for lumbar radiculopathy.

## 2023-05-19 NOTE — ASSESSMENT & PLAN NOTE
Severe, as seen on MRI  Patient is not interested in surgery  Will plan for appointment with pain management.

## 2023-05-19 NOTE — ASSESSMENT & PLAN NOTE
Health care maintenance and core gaps reviewed and assessed with patient.  Vaccinations recommended:        Tetanus - O       Shingles - O       Influenza - N/A       Pneumonia - 2023  Colon cancer screening:   Colonoscopy: N/A  Lifestyle recommendations given.  Physical activity recommended.    Legend: Ordered (O), Declined (D), Current (C)

## 2023-08-21 PROBLEM — Z00.00 ROUTINE GENERAL MEDICAL EXAMINATION AT A HEALTH CARE FACILITY: Status: RESOLVED | Noted: 2023-05-16 | Resolved: 2023-08-21

## 2023-08-21 PROBLEM — Z00.00 HEALTHCARE MAINTENANCE: Status: RESOLVED | Noted: 2023-05-16 | Resolved: 2023-08-21

## 2023-12-14 ENCOUNTER — PATIENT MESSAGE (OUTPATIENT)
Dept: INTERNAL MEDICINE | Facility: CLINIC | Age: 83
End: 2023-12-14

## 2025-01-04 NOTE — PROGRESS NOTES
Nursing Transfer Note      1/3/2025   10:40 PM    Nurse giving handoff  Alissa  Nurse receiving handoff:Laisha    Reason patient is being transferred: Decrease level of care    Transfer To: 2500 B from 2500 A    Transfer via wheelchair    Transfer with cardiac monitoring    Transported by Nurse    Transfer Vital Signs:  Blood Pressure:172/71  Heart Rate:70  O2:95  Temperature:97.7  Respirations:22    Telemetry: Rhythm NS  Order for Tele Monitor? Yes    Additional Lines:     Medicines sent: no    Any special needs or follow-up needed: no    Patient belongings transferred with patient: Yes    Chart send with patient: Yes    Notified: Dr Burton    Patient reassessed at: 1/3/25 @ 2220 (date, time)  1  Upon arrival to floor: patient oriented to room, call bell in reach, and bed in lowest position   Subjective:       Patient ID: Ar Perez is a 79 y.o. male.    Chief Complaint: Benign Prostatic Hypertrophy    Ar Perez is a 79 y.o. Male with BPH  11/19/2009 had left hydrocelectomy and epididymectomy with Dr. Kumar  He was last seen in clinic 05/08/2018 with Dr. Rubio.  He has been taking Finasteride and Flomax and Cialis 5mg daily  Last clinic started on Myrbetriq, but changed to Sanctura.     He is with his wife; they are from [facility].     He stopped the Finasteride due to breast tenderness.   CT at home showed gynecomastia.   He did not start myrbetriq or Sanctura.  He takes flomax once daily; could not tolerate twice a day.  FOS ok for him; nocturia 3x.  (+) urgency; urge incontinence at times  He had a CVA years ago; takes daily ASA but his PCP prefers he not go under anesthesia.                            PSA                      1.3                 03/01/2019                      Past Medical History:  No date: Arthritis  No date: BPH (benign prostatic hypertrophy) with urinary obstruction  No date: Cataract      Comment:  right eye  10/16/2012: Essential hypertension  No date: Hypertension  No date: PONV (postoperative nausea and vomiting)  No date: Stroke  No date: Urinary tract infection    Past Surgical History:  No date: ADENOIDECTOMY  No date: APPENDECTOMY  6-: CATARACT EXTRACTION      Comment:  os  08/14/2017: CATARACT EXTRACTION W/  INTRAOCULAR LENS IMPLANT; Right      Comment:  Dr. Hooper  No date: EPIDIDYMECTOMY  7/28/2014: [id number] END; Right      Comment:  Performed by Sigifredo Rubio MD at [facility]  No date: EYE SURGERY  8/14/2017: INSERTION-INTRAOCULAR LENS (IOL); Right      Comment:  Performed by Noam Hooper MD at [facility]  6/8/2016: PEG TUBE PLACEMENT; N/A      Comment:  [facility]  8/14/2017: PHACOEMULSIFICATION-ASPIRATION-CATARACT; Right      Comment:  Performed by Noam Hooper MD at [facility]  [facility]  7/28/2014: REPAIR ROTATOR CUFF ARTHROSCOPIC; Right      Comment:  Performed by Sigifredo Rubio MD at [facility] OR  7/28/2014: REPAIR-TENDON-BICEP; Right      Comment:  Performed by Sigifredo Rubio MD at [facility] OR  No date: SHOULDER ARTHROSCOPY  No date: SHOULDER SURGERY  No date: TONSILLECTOMY    Review of patient's family history indicates:  Problem: Cancer      Relation: Mother          Age of Onset: (Not Specified)  Problem: Cataracts      Relation: Mother          Age of Onset: (Not Specified)  Problem: Heart disease      Relation: Father          Age of Onset: (Not Specified)  Problem: Heart attack      Relation: Father          Age of Onset: (Not Specified)  Problem: Cancer      Relation: Brother          Age of Onset: (Not Specified)  Problem: Amblyopia      Relation: Neg Hx          Age of Onset: (Not Specified)  Problem: Blindness      Relation: Neg Hx          Age of Onset: (Not Specified)  Problem: Glaucoma      Relation: Neg Hx          Age of Onset: (Not Specified)  Problem: Macular degeneration      Relation: Neg Hx          Age of Onset: (Not Specified)  Problem: Retinal detachment      Relation: Neg Hx          Age of Onset: (Not Specified)  Problem: Strabismus      Relation: Neg Hx          Age of Onset: (Not Specified)  Problem: Melanoma      Relation: Neg Hx          Age of Onset: (Not Specified)      Social History    Socioeconomic History      Marital status:       Spouse name: Not on file      Number of children: Not on file      Years of education: Not on file      Highest education level: Not on file    Social Needs      Financial resource strain: Not on file      Food insecurity - worry: Not on file      Food insecurity - inability: Not on file      Transportation needs - medical: Not on file      Transportation needs - non-medical: Not on file    Occupational History      Not on file    Tobacco Use      Smoking status: Never Smoker      Smokeless tobacco: Never Used     Substance and Sexual Activity      Alcohol use: No        Comment: socially      Drug use: No      Sexual activity: No    Other Topics      Concerns:        Not on file    Social History Narrative      Not on file      Allergies:  Bactrim (sulfamethoxazole-trimethoprim) and Ambien (zolpidem)    Medications:  Current Outpatient Medications:   aspirin 325 MG tablet, Take 325 mg by mouth once daily., Disp: , Rfl:   cholecalciferol, vitamin D3, 50,000 unit capsule, Take by mouth once a week., Disp: 12 capsule, Rfl: 0  CYANOCOBALAMIN, VITAMIN B-12, (VITAMIN B-12 ORAL), Take 1 tablet by mouth once daily., Disp: , Rfl:   docusate sodium (COLACE) 100 MG capsule, Take 1 capsule (100 mg total) by mouth 2 (two) times daily., Disp: , Rfl: 0  fluorouracil (EFUDEX) 5 % cream, Use hs for 2 weeks, Disp: 40 g, Rfl: 3  fluticasone (FLONASE) 50 mcg/actuation nasal spray, 1 spray by Each Nare route every evening., Disp: , Rfl:   ibuprofen (ADVIL,MOTRIN) 800 MG tablet, Take 1 tablet (800 mg total) by mouth every 6 (six) hours as needed for Pain., Disp: 90 tablet, Rfl: 3  losartan (COZAAR) 50 MG tablet, Take 1 tablet (50 mg total) by mouth once daily. (Patient taking differently: Take 25 mg by mouth once daily. ), Disp: 15 tablet, Rfl: 0  multivitamin capsule, Take 1 capsule by mouth once daily., Disp: , Rfl:   tadalafil (CIALIS) 5 MG tablet, Take 1 tablet (5 mg total) by mouth daily as needed for Erectile Dysfunction., Disp: 30 tablet, Rfl: 12  tamsulosin (FLOMAX) 0.4 mg Cap, Take 1 capsule (0.4 mg total) by mouth once daily., Disp: 90 capsule, Rfl: 3  traZODone (DESYREL) 50 MG tablet, Take 1 tablet (50 mg total) by mouth every evening., Disp: 100 tablet, Rfl: 0  trospium (SANCTURA XR) 60 mg Cp24 capsule, Take 1 capsule (60 mg total) by mouth once daily., Disp: 90 capsule, Rfl: 3          Review of Systems   Constitutional: Negative for activity change, appetite change, chills and fever.   HENT: Negative for facial swelling  and trouble swallowing.    Eyes: Negative for visual disturbance.   Respiratory: Negative for chest tightness and shortness of breath.    Cardiovascular: Negative for chest pain and palpitations.   Gastrointestinal: Negative.  Negative for abdominal pain, constipation, diarrhea, nausea and vomiting.   Genitourinary: Positive for nocturia and urgency. Negative for difficulty urinating, dysuria, flank pain, hematuria, penile pain, penile swelling, scrotal swelling and testicular pain.   Musculoskeletal: Negative for back pain, gait problem, myalgias and neck stiffness.   Skin: Negative for rash.   Neurological: Negative for dizziness and speech difficulty.   Hematological: Does not bruise/bleed easily.   Psychiatric/Behavioral: Negative for behavioral problems.       Objective:      Physical Exam   Nursing note and vitals reviewed.  Constitutional: He is oriented to person, place, and time. Vital signs are normal. He appears well-developed and well-nourished. He is active and cooperative.  Non-toxic appearance. He does not have a sickly appearance.   Urine dipped clear of infection in the office today.  PVR in the office was 110     HENT:   Head: Normocephalic and atraumatic.   Right Ear: External ear normal.   Left Ear: External ear normal.   Nose: Nose normal.   Mouth/Throat: Mucous membranes are normal.   Eyes: Conjunctivae and lids are normal. No scleral icterus.   Neck: Trachea normal, normal range of motion and full passive range of motion without pain. Neck supple. No JVD present. No tracheal deviation present.   Cardiovascular: Normal rate, S1 normal and S2 normal.    Pulmonary/Chest: Effort normal. No respiratory distress. He exhibits no tenderness.   Abdominal: Soft. Normal appearance and bowel sounds are normal. There is no hepatosplenomegaly. There is no tenderness. There is no CVA tenderness.   Genitourinary: Rectum normal, testes normal and penis normal. Rectal exam shows no external hemorrhoid, no mass  and no tenderness. Prostate is enlarged. Prostate is not tender. No penile tenderness.       Musculoskeletal: Normal range of motion.   Neurological: He is alert and oriented to person, place, and time. He has normal strength.   Skin: Skin is warm, dry and intact.     Psychiatric: He has a normal mood and affect. His behavior is normal. Judgment and thought content normal.       Assessment:       1. BPH with urinary obstruction    2. Urge incontinence    3. Urinary frequency    4. Erectile dysfunction due to arterial insufficiency        Plan:         I spent 25 minutes with the patient of which more than half was spent in direct consultation with the patient in regards to our treatment and plan.    Education and recommendations of today's plan of care including home remedies.  We discussed his LUTS and contributory factors.   Discussed medical and surgical management; unable to go under general anesthesia; discussed TUMT;   He going to try Sanctura XR, continue Flomax and daily Cialis 5mg.  Discussed the expectations with stopping Finasteride.  His PSA is very stable; no need to repeat or continue based on AUA guidelines. Continue ITZEL.  Will mail him information on TUMT for his consideration.

## (undated) DEVICE — SOL WATER STRL IRR 1000ML

## (undated) DEVICE — GLASSES EYE PROTECTIVE

## (undated) DEVICE — SOL BETADINE 5%

## (undated) DEVICE — Device

## (undated) DEVICE — GLOVE BIOGEL SKINSENSE PI 7.5

## (undated) DEVICE — GLOVE BIOGEL SKINSENSE PI 6.5

## (undated) DEVICE — CASSETTE INFINITI